# Patient Record
Sex: MALE | Race: WHITE | NOT HISPANIC OR LATINO | Employment: OTHER | URBAN - METROPOLITAN AREA
[De-identification: names, ages, dates, MRNs, and addresses within clinical notes are randomized per-mention and may not be internally consistent; named-entity substitution may affect disease eponyms.]

---

## 2018-04-29 ENCOUNTER — APPOINTMENT (EMERGENCY)
Dept: CT IMAGING | Facility: HOSPITAL | Age: 73
DRG: 293 | End: 2018-04-29
Attending: EMERGENCY MEDICINE
Payer: COMMERCIAL

## 2018-04-29 ENCOUNTER — HOSPITAL ENCOUNTER (INPATIENT)
Facility: HOSPITAL | Age: 73
LOS: 5 days | Discharge: NURSING, REHAB OR LONG-TERM CARE | DRG: 293 | End: 2018-05-04
Attending: EMERGENCY MEDICINE | Admitting: INTERNAL MEDICINE
Payer: COMMERCIAL

## 2018-04-29 ENCOUNTER — APPOINTMENT (EMERGENCY)
Dept: RADIOLOGY | Facility: HOSPITAL | Age: 73
DRG: 293 | End: 2018-04-29
Payer: COMMERCIAL

## 2018-04-29 ENCOUNTER — APPOINTMENT (EMERGENCY)
Dept: CT IMAGING | Facility: HOSPITAL | Age: 73
DRG: 293 | End: 2018-04-29
Payer: COMMERCIAL

## 2018-04-29 DIAGNOSIS — I50.9 CHF (CONGESTIVE HEART FAILURE) (HCC): Primary | ICD-10-CM

## 2018-04-29 DIAGNOSIS — E66.9 OBESE: ICD-10-CM

## 2018-04-29 DIAGNOSIS — I50.33 ACUTE ON CHRONIC DIASTOLIC CONGESTIVE HEART FAILURE (HCC): ICD-10-CM

## 2018-04-29 DIAGNOSIS — E87.6 HYPOKALEMIA: ICD-10-CM

## 2018-04-29 DIAGNOSIS — E83.42 HYPOMAGNESEMIA: ICD-10-CM

## 2018-04-29 DIAGNOSIS — E11.649 TYPE 2 DIABETES MELLITUS WITH HYPOGLYCEMIA WITHOUT COMA, UNSPECIFIED WHETHER LONG TERM INSULIN USE (HCC): ICD-10-CM

## 2018-04-29 LAB
ALBUMIN SERPL BCP-MCNC: 2.7 G/DL (ref 3.5–5)
ALP SERPL-CCNC: 119 U/L (ref 46–116)
ALT SERPL W P-5'-P-CCNC: 8 U/L (ref 12–78)
ANION GAP SERPL CALCULATED.3IONS-SCNC: 6 MMOL/L (ref 4–13)
APTT PPP: 46 SECONDS (ref 23–35)
AST SERPL W P-5'-P-CCNC: 16 U/L (ref 5–45)
ATRIAL RATE: 326 BPM
BASOPHILS # BLD AUTO: 0.01 THOUSANDS/ΜL (ref 0–0.1)
BASOPHILS NFR BLD AUTO: 0 % (ref 0–1)
BILIRUB SERPL-MCNC: 1.2 MG/DL (ref 0.2–1)
BILIRUB UR QL STRIP: NEGATIVE
BUN SERPL-MCNC: 19 MG/DL (ref 5–25)
CALCIUM SERPL-MCNC: 7.7 MG/DL (ref 8.3–10.1)
CHLORIDE SERPL-SCNC: 104 MMOL/L (ref 100–108)
CLARITY UR: CLEAR
CO2 SERPL-SCNC: 36 MMOL/L (ref 21–32)
COLOR UR: YELLOW
CREAT SERPL-MCNC: 1.14 MG/DL (ref 0.6–1.3)
EOSINOPHIL # BLD AUTO: 0.21 THOUSAND/ΜL (ref 0–0.61)
EOSINOPHIL NFR BLD AUTO: 2 % (ref 0–6)
ERYTHROCYTE [DISTWIDTH] IN BLOOD BY AUTOMATED COUNT: 15.2 % (ref 11.6–15.1)
GFR SERPL CREATININE-BSD FRML MDRD: 63 ML/MIN/1.73SQ M
GLUCOSE SERPL-MCNC: 180 MG/DL (ref 65–140)
GLUCOSE SERPL-MCNC: 48 MG/DL (ref 65–140)
GLUCOSE SERPL-MCNC: 52 MG/DL (ref 65–140)
GLUCOSE UR STRIP-MCNC: NEGATIVE MG/DL
HCT VFR BLD AUTO: 33.8 % (ref 36.5–49.3)
HGB BLD-MCNC: 10.1 G/DL (ref 12–17)
HGB UR QL STRIP.AUTO: ABNORMAL
INR PPP: 2.38 (ref 0.86–1.16)
KETONES UR STRIP-MCNC: ABNORMAL MG/DL
LEUKOCYTE ESTERASE UR QL STRIP: NEGATIVE
LYMPHOCYTES # BLD AUTO: 1.05 THOUSANDS/ΜL (ref 0.6–4.47)
LYMPHOCYTES NFR BLD AUTO: 12 % (ref 14–44)
MAGNESIUM SERPL-MCNC: 1.3 MG/DL (ref 1.6–2.6)
MCH RBC QN AUTO: 29.4 PG (ref 26.8–34.3)
MCHC RBC AUTO-ENTMCNC: 29.9 G/DL (ref 31.4–37.4)
MCV RBC AUTO: 99 FL (ref 82–98)
MONOCYTES # BLD AUTO: 0.71 THOUSAND/ΜL (ref 0.17–1.22)
MONOCYTES NFR BLD AUTO: 8 % (ref 4–12)
NEUTROPHILS # BLD AUTO: 6.66 THOUSANDS/ΜL (ref 1.85–7.62)
NEUTS SEG NFR BLD AUTO: 78 % (ref 43–75)
NITRITE UR QL STRIP: NEGATIVE
NT-PROBNP SERPL-MCNC: 4909 PG/ML
PH UR STRIP.AUTO: 7 [PH] (ref 4.5–8)
PLATELET # BLD AUTO: 236 THOUSANDS/UL (ref 149–390)
PMV BLD AUTO: 10.4 FL (ref 8.9–12.7)
POTASSIUM SERPL-SCNC: 2.9 MMOL/L (ref 3.5–5.3)
PROT SERPL-MCNC: 6.5 G/DL (ref 6.4–8.2)
PROT UR STRIP-MCNC: NEGATIVE MG/DL
PROTHROMBIN TIME: 25.9 SECONDS (ref 12.1–14.4)
QRS AXIS: 0 DEGREES
QRSD INTERVAL: 82 MS
QT INTERVAL: 362 MS
QTC INTERVAL: 462 MS
RBC # BLD AUTO: 3.43 MILLION/UL (ref 3.88–5.62)
SODIUM SERPL-SCNC: 146 MMOL/L (ref 136–145)
SP GR UR STRIP.AUTO: 1.01 (ref 1–1.03)
T WAVE AXIS: 58 DEGREES
TROPONIN I SERPL-MCNC: <0.02 NG/ML
TSH SERPL DL<=0.05 MIU/L-ACNC: 3.3 UIU/ML (ref 0.36–3.74)
UROBILINOGEN UR QL STRIP.AUTO: 2 E.U./DL
VENTRICULAR RATE: 98 BPM
WBC # BLD AUTO: 8.64 THOUSAND/UL (ref 4.31–10.16)

## 2018-04-29 PROCEDURE — 70450 CT HEAD/BRAIN W/O DYE: CPT

## 2018-04-29 PROCEDURE — 93005 ELECTROCARDIOGRAM TRACING: CPT

## 2018-04-29 PROCEDURE — 81001 URINALYSIS AUTO W/SCOPE: CPT | Performed by: EMERGENCY MEDICINE

## 2018-04-29 PROCEDURE — 85610 PROTHROMBIN TIME: CPT | Performed by: EMERGENCY MEDICINE

## 2018-04-29 PROCEDURE — 85025 COMPLETE CBC W/AUTO DIFF WBC: CPT | Performed by: EMERGENCY MEDICINE

## 2018-04-29 PROCEDURE — 85730 THROMBOPLASTIN TIME PARTIAL: CPT | Performed by: EMERGENCY MEDICINE

## 2018-04-29 PROCEDURE — 84443 ASSAY THYROID STIM HORMONE: CPT | Performed by: EMERGENCY MEDICINE

## 2018-04-29 PROCEDURE — 73590 X-RAY EXAM OF LOWER LEG: CPT

## 2018-04-29 PROCEDURE — 71045 X-RAY EXAM CHEST 1 VIEW: CPT

## 2018-04-29 PROCEDURE — 83735 ASSAY OF MAGNESIUM: CPT | Performed by: EMERGENCY MEDICINE

## 2018-04-29 PROCEDURE — 96365 THER/PROPH/DIAG IV INF INIT: CPT

## 2018-04-29 PROCEDURE — 96375 TX/PRO/DX INJ NEW DRUG ADDON: CPT

## 2018-04-29 PROCEDURE — 93010 ELECTROCARDIOGRAM REPORT: CPT | Performed by: INTERNAL MEDICINE

## 2018-04-29 PROCEDURE — 83880 ASSAY OF NATRIURETIC PEPTIDE: CPT | Performed by: EMERGENCY MEDICINE

## 2018-04-29 PROCEDURE — 84484 ASSAY OF TROPONIN QUANT: CPT | Performed by: EMERGENCY MEDICINE

## 2018-04-29 PROCEDURE — 82948 REAGENT STRIP/BLOOD GLUCOSE: CPT

## 2018-04-29 PROCEDURE — 36415 COLL VENOUS BLD VENIPUNCTURE: CPT | Performed by: EMERGENCY MEDICINE

## 2018-04-29 PROCEDURE — 80053 COMPREHEN METABOLIC PANEL: CPT | Performed by: EMERGENCY MEDICINE

## 2018-04-29 PROCEDURE — 96374 THER/PROPH/DIAG INJ IV PUSH: CPT

## 2018-04-29 RX ORDER — ATORVASTATIN CALCIUM 40 MG/1
40 TABLET, FILM COATED ORAL DAILY
COMMUNITY

## 2018-04-29 RX ORDER — POTASSIUM CHLORIDE 14.9 MG/ML
20 INJECTION INTRAVENOUS ONCE
Status: COMPLETED | OUTPATIENT
Start: 2018-04-29 | End: 2018-04-30

## 2018-04-29 RX ORDER — FUROSEMIDE 20 MG/1
20 TABLET ORAL DAILY
Status: ON HOLD | COMMUNITY
End: 2018-05-04

## 2018-04-29 RX ORDER — POTASSIUM CHLORIDE 20 MEQ/1
20 TABLET, EXTENDED RELEASE ORAL 2 TIMES DAILY
COMMUNITY

## 2018-04-29 RX ORDER — PANTOPRAZOLE SODIUM 40 MG/1
40 TABLET, DELAYED RELEASE ORAL DAILY
COMMUNITY

## 2018-04-29 RX ORDER — MAGNESIUM SULFATE HEPTAHYDRATE 40 MG/ML
2 INJECTION, SOLUTION INTRAVENOUS ONCE
Status: COMPLETED | OUTPATIENT
Start: 2018-04-29 | End: 2018-04-30

## 2018-04-29 RX ORDER — DEXTROSE MONOHYDRATE 25 G/50ML
50 INJECTION, SOLUTION INTRAVENOUS ONCE
Status: COMPLETED | OUTPATIENT
Start: 2018-04-29 | End: 2018-04-29

## 2018-04-29 RX ORDER — DULOXETIN HYDROCHLORIDE 30 MG/1
20 CAPSULE, DELAYED RELEASE ORAL 2 TIMES DAILY
COMMUNITY

## 2018-04-29 RX ORDER — CALCIUM CARBONATE/VITAMIN D3 500-10/5ML
LIQUID (ML) ORAL DAILY
COMMUNITY

## 2018-04-29 RX ORDER — FUROSEMIDE 10 MG/ML
40 INJECTION INTRAMUSCULAR; INTRAVENOUS ONCE
Status: COMPLETED | OUTPATIENT
Start: 2018-04-29 | End: 2018-04-29

## 2018-04-29 RX ORDER — EZETIMIBE 10 MG/1
10 TABLET ORAL DAILY
COMMUNITY

## 2018-04-29 RX ORDER — ACETAMINOPHEN 325 MG/1
650 TABLET ORAL EVERY 4 HOURS PRN
COMMUNITY

## 2018-04-29 RX ORDER — POTASSIUM CHLORIDE 20 MEQ/1
20 TABLET, EXTENDED RELEASE ORAL ONCE
Status: DISCONTINUED | OUTPATIENT
Start: 2018-04-29 | End: 2018-05-04 | Stop reason: HOSPADM

## 2018-04-29 RX ORDER — BUPROPION HYDROCHLORIDE 150 MG/1
150 TABLET, EXTENDED RELEASE ORAL 2 TIMES DAILY
COMMUNITY

## 2018-04-29 RX ORDER — METOPROLOL TARTRATE 50 MG/1
25 TABLET, FILM COATED ORAL 2 TIMES DAILY
COMMUNITY

## 2018-04-29 RX ORDER — WARFARIN SODIUM 2.5 MG/1
TABLET ORAL DAILY
COMMUNITY

## 2018-04-29 RX ORDER — LEVOTHYROXINE SODIUM 0.05 MG/1
50 TABLET ORAL DAILY
COMMUNITY

## 2018-04-29 RX ADMIN — POTASSIUM CHLORIDE 20 MEQ: 200 INJECTION, SOLUTION INTRAVENOUS at 22:32

## 2018-04-29 RX ADMIN — FUROSEMIDE 40 MG: 10 INJECTION, SOLUTION INTRAVENOUS at 22:44

## 2018-04-29 RX ADMIN — DEXTROSE MONOHYDRATE 50 ML: 25 INJECTION, SOLUTION INTRAVENOUS at 21:30

## 2018-04-30 PROBLEM — J96.01 ACUTE RESPIRATORY FAILURE WITH HYPOXIA (HCC): Status: ACTIVE | Noted: 2018-04-30

## 2018-04-30 PROBLEM — D64.9 ANEMIA: Status: ACTIVE | Noted: 2018-04-30

## 2018-04-30 PROBLEM — E87.6 HYPOKALEMIA: Status: ACTIVE | Noted: 2018-04-30

## 2018-04-30 PROBLEM — E83.42 HYPOMAGNESEMIA: Status: ACTIVE | Noted: 2018-04-30

## 2018-04-30 LAB
ANION GAP SERPL CALCULATED.3IONS-SCNC: 5 MMOL/L (ref 4–13)
ANION GAP SERPL CALCULATED.3IONS-SCNC: 7 MMOL/L (ref 4–13)
BACTERIA UR QL AUTO: ABNORMAL /HPF
BUN SERPL-MCNC: 16 MG/DL (ref 5–25)
BUN SERPL-MCNC: 18 MG/DL (ref 5–25)
CALCIUM SERPL-MCNC: 7.5 MG/DL (ref 8.3–10.1)
CALCIUM SERPL-MCNC: 8.1 MG/DL (ref 8.3–10.1)
CHLORIDE SERPL-SCNC: 104 MMOL/L (ref 100–108)
CHLORIDE SERPL-SCNC: 105 MMOL/L (ref 100–108)
CHOLEST SERPL-MCNC: 61 MG/DL (ref 50–200)
CO2 SERPL-SCNC: 34 MMOL/L (ref 21–32)
CO2 SERPL-SCNC: 35 MMOL/L (ref 21–32)
CREAT SERPL-MCNC: 1.02 MG/DL (ref 0.6–1.3)
CREAT SERPL-MCNC: 1.04 MG/DL (ref 0.6–1.3)
ERYTHROCYTE [DISTWIDTH] IN BLOOD BY AUTOMATED COUNT: 15.2 % (ref 11.6–15.1)
FERRITIN SERPL-MCNC: 53 NG/ML (ref 8–388)
GFR SERPL CREATININE-BSD FRML MDRD: 70 ML/MIN/1.73SQ M
GFR SERPL CREATININE-BSD FRML MDRD: 72 ML/MIN/1.73SQ M
GLUCOSE SERPL-MCNC: 121 MG/DL (ref 65–140)
GLUCOSE SERPL-MCNC: 74 MG/DL (ref 65–140)
GLUCOSE SERPL-MCNC: 76 MG/DL (ref 65–140)
GLUCOSE SERPL-MCNC: 79 MG/DL (ref 65–140)
GLUCOSE SERPL-MCNC: 88 MG/DL (ref 65–140)
GLUCOSE SERPL-MCNC: 88 MG/DL (ref 65–140)
GLUCOSE SERPL-MCNC: 89 MG/DL (ref 65–140)
HCT VFR BLD AUTO: 32.1 % (ref 36.5–49.3)
HDLC SERPL-MCNC: 33 MG/DL (ref 40–60)
HGB BLD-MCNC: 9.5 G/DL (ref 12–17)
INR PPP: 2.44 (ref 0.86–1.16)
IRON SATN MFR SERPL: 19 %
IRON SERPL-MCNC: 66 UG/DL (ref 65–175)
LDLC SERPL CALC-MCNC: 20 MG/DL (ref 0–100)
MAGNESIUM SERPL-MCNC: 1.6 MG/DL (ref 1.6–2.6)
MCH RBC QN AUTO: 29.1 PG (ref 26.8–34.3)
MCHC RBC AUTO-ENTMCNC: 29.6 G/DL (ref 31.4–37.4)
MCV RBC AUTO: 98 FL (ref 82–98)
NON-SQ EPI CELLS URNS QL MICRO: ABNORMAL /HPF
PLATELET # BLD AUTO: 222 THOUSANDS/UL (ref 149–390)
PMV BLD AUTO: 10.5 FL (ref 8.9–12.7)
POTASSIUM SERPL-SCNC: 2.7 MMOL/L (ref 3.5–5.3)
POTASSIUM SERPL-SCNC: 3.4 MMOL/L (ref 3.5–5.3)
PROTHROMBIN TIME: 26.4 SECONDS (ref 12.1–14.4)
RBC # BLD AUTO: 3.27 MILLION/UL (ref 3.88–5.62)
RBC #/AREA URNS AUTO: ABNORMAL /HPF
SODIUM SERPL-SCNC: 145 MMOL/L (ref 136–145)
SODIUM SERPL-SCNC: 145 MMOL/L (ref 136–145)
TIBC SERPL-MCNC: 344 UG/DL (ref 250–450)
TRIGL SERPL-MCNC: 39 MG/DL
TROPONIN I SERPL-MCNC: <0.02 NG/ML
TROPONIN I SERPL-MCNC: <0.02 NG/ML
WBC # BLD AUTO: 7.62 THOUSAND/UL (ref 4.31–10.16)
WBC #/AREA URNS AUTO: ABNORMAL /HPF

## 2018-04-30 PROCEDURE — 99233 SBSQ HOSP IP/OBS HIGH 50: CPT | Performed by: HOSPITALIST

## 2018-04-30 PROCEDURE — 80048 BASIC METABOLIC PNL TOTAL CA: CPT | Performed by: HOSPITALIST

## 2018-04-30 PROCEDURE — 82948 REAGENT STRIP/BLOOD GLUCOSE: CPT

## 2018-04-30 PROCEDURE — 94640 AIRWAY INHALATION TREATMENT: CPT

## 2018-04-30 PROCEDURE — 99285 EMERGENCY DEPT VISIT HI MDM: CPT

## 2018-04-30 PROCEDURE — 82728 ASSAY OF FERRITIN: CPT | Performed by: NURSE PRACTITIONER

## 2018-04-30 PROCEDURE — 83735 ASSAY OF MAGNESIUM: CPT | Performed by: NURSE PRACTITIONER

## 2018-04-30 PROCEDURE — 83540 ASSAY OF IRON: CPT | Performed by: NURSE PRACTITIONER

## 2018-04-30 PROCEDURE — 85610 PROTHROMBIN TIME: CPT | Performed by: NURSE PRACTITIONER

## 2018-04-30 PROCEDURE — 87147 CULTURE TYPE IMMUNOLOGIC: CPT | Performed by: HOSPITALIST

## 2018-04-30 PROCEDURE — 85027 COMPLETE CBC AUTOMATED: CPT | Performed by: NURSE PRACTITIONER

## 2018-04-30 PROCEDURE — 87081 CULTURE SCREEN ONLY: CPT | Performed by: HOSPITALIST

## 2018-04-30 PROCEDURE — 83550 IRON BINDING TEST: CPT | Performed by: NURSE PRACTITIONER

## 2018-04-30 PROCEDURE — 84484 ASSAY OF TROPONIN QUANT: CPT | Performed by: NURSE PRACTITIONER

## 2018-04-30 PROCEDURE — 80048 BASIC METABOLIC PNL TOTAL CA: CPT | Performed by: NURSE PRACTITIONER

## 2018-04-30 PROCEDURE — 80061 LIPID PANEL: CPT | Performed by: NURSE PRACTITIONER

## 2018-04-30 PROCEDURE — 94760 N-INVAS EAR/PLS OXIMETRY 1: CPT

## 2018-04-30 RX ORDER — WARFARIN SODIUM 2.5 MG/1
2.5 TABLET ORAL DAILY
Status: DISCONTINUED | OUTPATIENT
Start: 2018-04-30 | End: 2018-05-04 | Stop reason: HOSPADM

## 2018-04-30 RX ORDER — PANTOPRAZOLE SODIUM 40 MG/1
40 TABLET, DELAYED RELEASE ORAL
Status: DISCONTINUED | OUTPATIENT
Start: 2018-04-30 | End: 2018-05-04 | Stop reason: HOSPADM

## 2018-04-30 RX ORDER — ATORVASTATIN CALCIUM 40 MG/1
40 TABLET, FILM COATED ORAL
Status: DISCONTINUED | OUTPATIENT
Start: 2018-04-30 | End: 2018-05-04 | Stop reason: HOSPADM

## 2018-04-30 RX ORDER — POTASSIUM CHLORIDE 20 MEQ/1
40 TABLET, EXTENDED RELEASE ORAL ONCE
Status: COMPLETED | OUTPATIENT
Start: 2018-04-30 | End: 2018-04-30

## 2018-04-30 RX ORDER — ACETAMINOPHEN 325 MG/1
650 TABLET ORAL EVERY 4 HOURS PRN
Status: DISCONTINUED | OUTPATIENT
Start: 2018-04-30 | End: 2018-05-04 | Stop reason: HOSPADM

## 2018-04-30 RX ORDER — BUPROPION HYDROCHLORIDE 150 MG/1
150 TABLET, EXTENDED RELEASE ORAL 2 TIMES DAILY
Status: DISCONTINUED | OUTPATIENT
Start: 2018-04-30 | End: 2018-05-04 | Stop reason: HOSPADM

## 2018-04-30 RX ORDER — POTASSIUM CHLORIDE 14.9 MG/ML
20 INJECTION INTRAVENOUS
Status: DISPENSED | OUTPATIENT
Start: 2018-04-30 | End: 2018-04-30

## 2018-04-30 RX ORDER — DULOXETIN HYDROCHLORIDE 20 MG/1
20 CAPSULE, DELAYED RELEASE ORAL 2 TIMES DAILY
Status: DISCONTINUED | OUTPATIENT
Start: 2018-04-30 | End: 2018-05-04 | Stop reason: HOSPADM

## 2018-04-30 RX ORDER — FUROSEMIDE 10 MG/ML
40 INJECTION INTRAMUSCULAR; INTRAVENOUS
Status: DISCONTINUED | OUTPATIENT
Start: 2018-04-30 | End: 2018-05-02

## 2018-04-30 RX ORDER — MELATONIN
2000 DAILY
Status: DISCONTINUED | OUTPATIENT
Start: 2018-04-30 | End: 2018-05-04 | Stop reason: HOSPADM

## 2018-04-30 RX ORDER — POTASSIUM CHLORIDE 20 MEQ/1
20 TABLET, EXTENDED RELEASE ORAL 2 TIMES DAILY
Status: DISCONTINUED | OUTPATIENT
Start: 2018-04-30 | End: 2018-05-04 | Stop reason: HOSPADM

## 2018-04-30 RX ORDER — EZETIMIBE 10 MG/1
10 TABLET ORAL DAILY
Status: DISCONTINUED | OUTPATIENT
Start: 2018-04-30 | End: 2018-05-04 | Stop reason: HOSPADM

## 2018-04-30 RX ORDER — LEVOTHYROXINE SODIUM 0.03 MG/1
50 TABLET ORAL
Status: DISCONTINUED | OUTPATIENT
Start: 2018-04-30 | End: 2018-05-04 | Stop reason: HOSPADM

## 2018-04-30 RX ADMIN — METOPROLOL TARTRATE 25 MG: 25 TABLET ORAL at 17:01

## 2018-04-30 RX ADMIN — DULOXETINE HYDROCHLORIDE 20 MG: 20 CAPSULE, DELAYED RELEASE ORAL at 08:57

## 2018-04-30 RX ADMIN — POTASSIUM CHLORIDE 20 MEQ: 200 INJECTION, SOLUTION INTRAVENOUS at 06:29

## 2018-04-30 RX ADMIN — METOPROLOL TARTRATE 25 MG: 25 TABLET ORAL at 08:56

## 2018-04-30 RX ADMIN — WARFARIN SODIUM 2.5 MG: 2.5 TABLET ORAL at 17:01

## 2018-04-30 RX ADMIN — POTASSIUM CHLORIDE 20 MEQ: 1500 TABLET, EXTENDED RELEASE ORAL at 08:56

## 2018-04-30 RX ADMIN — FUROSEMIDE 40 MG: 10 INJECTION, SOLUTION INTRAVENOUS at 08:56

## 2018-04-30 RX ADMIN — MAGNESIUM SULFATE HEPTAHYDRATE 2 G: 40 INJECTION, SOLUTION INTRAVENOUS at 02:02

## 2018-04-30 RX ADMIN — PANTOPRAZOLE SODIUM 40 MG: 40 TABLET, DELAYED RELEASE ORAL at 06:28

## 2018-04-30 RX ADMIN — VITAMIN D, TAB 1000IU (100/BT) 2000 UNITS: 25 TAB at 08:56

## 2018-04-30 RX ADMIN — LEVOTHYROXINE SODIUM 50 MCG: 25 TABLET ORAL at 06:28

## 2018-04-30 RX ADMIN — DULOXETINE HYDROCHLORIDE 20 MG: 20 CAPSULE, DELAYED RELEASE ORAL at 17:02

## 2018-04-30 RX ADMIN — EZETIMIBE 10 MG: 10 TABLET ORAL at 08:56

## 2018-04-30 RX ADMIN — CARBIDOPA AND LEVODOPA 1 TABLET: 25; 100 TABLET ORAL at 06:28

## 2018-04-30 RX ADMIN — POTASSIUM CHLORIDE 20 MEQ: 1500 TABLET, EXTENDED RELEASE ORAL at 17:01

## 2018-04-30 RX ADMIN — FUROSEMIDE 40 MG: 10 INJECTION, SOLUTION INTRAVENOUS at 17:01

## 2018-04-30 RX ADMIN — CARBIDOPA AND LEVODOPA 1 TABLET: 25; 100 TABLET ORAL at 12:31

## 2018-04-30 RX ADMIN — CARBIDOPA AND LEVODOPA 1 TABLET: 25; 100 TABLET ORAL at 20:29

## 2018-04-30 RX ADMIN — FLUTICASONE PROPIONATE AND SALMETEROL 1 PUFF: 50; 250 POWDER RESPIRATORY (INHALATION) at 20:35

## 2018-04-30 RX ADMIN — FLUTICASONE PROPIONATE AND SALMETEROL 1 PUFF: 50; 250 POWDER RESPIRATORY (INHALATION) at 08:18

## 2018-04-30 RX ADMIN — POTASSIUM CHLORIDE 40 MEQ: 1500 TABLET, EXTENDED RELEASE ORAL at 08:57

## 2018-04-30 RX ADMIN — CARBIDOPA AND LEVODOPA 1 TABLET: 25; 100 TABLET ORAL at 02:01

## 2018-04-30 RX ADMIN — Medication 400 MG: at 08:56

## 2018-04-30 RX ADMIN — BUPROPION HYDROCHLORIDE 150 MG: 150 TABLET, EXTENDED RELEASE ORAL at 17:02

## 2018-04-30 RX ADMIN — POTASSIUM CHLORIDE 40 MEQ: 1500 TABLET, EXTENDED RELEASE ORAL at 06:28

## 2018-04-30 RX ADMIN — TIOTROPIUM BROMIDE 18 MCG: 18 CAPSULE ORAL; RESPIRATORY (INHALATION) at 08:18

## 2018-04-30 RX ADMIN — ATORVASTATIN CALCIUM 40 MG: 40 TABLET, FILM COATED ORAL at 17:01

## 2018-04-30 NOTE — ASSESSMENT & PLAN NOTE
Potassium 2 9 in ED  Pt given 40meq of IV potassium  Continue 20meq KCL BID   Will recheck BMP in am

## 2018-04-30 NOTE — CASE MANAGEMENT
Initial Clinical Review    Admission: Date/Time/Statement: 4/29/18 @ 2357     Orders Placed This Encounter   Procedures    Inpatient Admission (expected length of stay for this patient is greater than two midnights)     Standing Status:   Standing     Number of Occurrences:   1     Order Specific Question:   Admitting Physician     Answer:   Sarah Escobar [[id number]]     Order Specific Question:   Level of Care     Answer:   Med Surg [16]     Order Specific Question:   Estimated length of stay     Answer:   More than 2 Midnights     Order Specific Question:   Certification     Answer:   I certify that inpatient services are medically necessary for this patient for a duration of greater than two midnights  See H&P and MD Progress Notes for additional information about the patient's course of treatment  ED: Date/Time/Mode of Arrival:   ED Arrival Information     Expected Arrival Acuity Means of Arrival Escorted By Service Admission Type    - 4/29/2018 20:33 Urgent Stretcher [facility] General Medicine Urgent    Arrival Complaint    RESPIRATORY DISTRESS      Chief Complaint:   Chief Complaint   Patient presents with    Shortness of Breath     pt presents to ER from [facility] with shortness of breath increased throughout the day, pts BS was in the 50's pt was given oral glucose and OJ per EMS  History of Illness:   Patient brought in from [facility] by ambulance for shortness of breath several hours  Ambulance crew noted BS 51 and gave glucose oral, OJ, and no improvement of BS  Patient was mid [de-identified] on room air but came up to 90s on oxygen  Patient denies SOB    He is oriented to date  ED Vital Signs:   ED Triage Vitals   Temperature Pulse Respirations Blood Pressure SpO2   04/29/18 2037 04/29/18 2037 04/29/18 2037 04/29/18 2045 04/29/18 2037   (!) 96 9 °F (36 1 °C) 98 20 135/79 (!) 86 %      Temp Source Heart Rate Source Patient Position - Orthostatic VS BP Location FiO2 (%)   04/29/18 2037 04/29/18 2037 04/30/18 0121 -- --   Temporal Monitor Lying        Pain Score       04/29/18 2037       No Pain        Wt Readings from Last 1 Encounters:   04/30/18 129 kg (284 lb 6 3 oz)   Vital Signs (abnormal): Abnormal Labs/Diagnostic Test Results:   HGB 10 1 PT 25 9 INR 2 38 PTT 46  K 2 9 CO2 36 GLUC 52 CA 7 7 ALT 8 ALK PHOS 119 ALB 2 7 TBILI 1 20 MG 1 3 BNP 4909  U/A+KETONES, BLOOD   CT HEAD= No CT evidence of acute intracranial process  Chronic microangiopathy  EKG=  Ventricular Rate BPM 98    Atrial Rate     NJ Interval ms    QRSD Interval ms 82    QT Interval ms 362    QTC Interval ms 462    P Axis degrees    QRS Axis degrees 0    T Wave Axis degrees 58      Atrial fibrillation with premature ventricular or aberrantly conducted complexes  Nonspecific ST and T wave abnormality  Abnormal ECG      ED Treatment:   Medication Administration from 04/29/2018 2033 to 04/30/2018 0105       Date/Time Order Dose Route Action Action by Comments     04/29/2018 2130 dextrose 50 % IV solution 50 mL 50 mL Intravenous Given Loreto Gonzáles RN      04/29/2018 2244 furosemide (LASIX) injection 40 mg 40 mg Intravenous Given Jose Cruz Line, RN      04/29/2018 2232 potassium chloride 20 mEq IVPB (premix) 20 mEq Intravenous [address] Jose Cruz Line, RN      04/29/2018 2228 potassium chloride (K-DUR,KLOR-CON) CR tablet 20 mEq 20 mEq Oral Not Given Jose Cruz Line, RN pt failed dysphagia     04/29/2018 2228 magnesium oxide (MAG-OX) tablet 400 mg 400 mg Oral Not Given Jose Cruz Line, RN pt failed dysphagia      Past Medical/Surgical History:    Active Ambulatory Problems     Diagnosis Date Noted    No Active Ambulatory Problems     Resolved Ambulatory Problems     Diagnosis Date Noted    No Resolved Ambulatory Problems     Past Medical History:   Diagnosis Date    Atrial fibrillation (HCC)     BPH (benign prostatic hyperplasia)     CAD (coronary artery disease)     CHF (congestive heart failure) (HCC)     COPD (chronic obstructive pulmonary disease) ([place] [address] )     Diabetes mellitus ([place] [address] )     Disease of thyroid gland     Hyperlipidemia     Hypertension     Metabolic encephalopathy     Obese     Parkinson disease ([place] [address] )    Admitting Diagnosis: Shortness of breath [R06 02]  Hypokalemia [E87 6]  Hypomagnesemia [E83 42]  CHF (congestive heart failure) ([place] [address] ) [I50 9]  Age/Sex: 74 y o  male  Assessment/Plan:   Acute respiratory failure with hypoxia ([facility])   Assessment & Plan     Administer O2 and titrate to keep SPO2>92%       CHF (congestive heart failure) ([facility])   Assessment & Plan     BNP 4909 in ED  CXR shows CHF  Will change Lasix 20mg PO daily to 40mg IV BID  Monitor I/O and daily weights  Will order ECHO        Hypomagnesemia   Assessment & Plan     Mag 1 3 in ED  Repleted with 2gm Mag Sulfate  Continue mag ox 400mg daily        Hypokalemia   Assessment & Plan     Potassium 2 9 in ED  Pt given 40meq of IV potassium  Continue 20meq KCL BID         Diabetes mellitus with hypoglycemia ([facility])   Assessment & Plan     Pt had glucose of 52 in ED  Given 1 amp of D50  Will hold Lantus and Victoza for now  Monitor blood glucose QAC and HS and initiate SSI        Anemia   Assessment & Plan     Hgb 10 1 in ED  Will check stool for occult blood  Will check iron panel  Monitor CBC and transfuse as needed         Parkinson disease ([place] [address] )   Assessment & Plan     Continue Sinemet         Atrial fibrillation ([facility])   Assessment & Plan     Currently afib ranging from 80-90s  Continue metoprolol  Continue coumadin for now  Monitor daily INR  Pt is scheduled for BEA on 5/2  For possible ablation vs pacemaker         Hypertension   Assessment & Plan     BP controlled  Continue metoprolol 25mg BID  Monitor BP per nursing unit         COPD (chronic obstructive pulmonary disease) ([facility])   Assessment & Plan     Pt had some wheezing noted on auscultation  Continue Advair and spiriva         Obese   Assessment & Plan     Inpatient consult to nutrition  Admission Orders:  TELEMETRY  ACCUCHECKS WITH COVERAGE SCALE  O2 TO KEEP SATS>92%  VENODYNES  Scheduled Meds:   Current Facility-Administered Medications:  acetaminophen 650 mg Oral Q4H PRN ANTON Arango    atorvastatin 40 mg Oral Daily With [employer], ANTON    buPROPion 150 mg Oral BID ANTON Arango    carbidopa-levodopa 1 tablet Oral Q6H ANTON Arango    cholecalciferol 2,000 Units Oral Daily ANTON Hodges    DULoxetine 20 mg Oral BID ANTON Arango    ezetimibe 10 mg Oral Daily ANTON Arango    fluticasone-salmeterol 1 puff Inhalation Q12H [address] ANTON Hodges    furosemide 40 mg Intravenous BID (diuretic) ANTON Arango    insulin lispro 1-5 Units Subcutaneous TID AC ANTON Hodges    insulin lispro 1-5 Units Subcutaneous HS ANTON Hodges    levothyroxine 50 mcg Oral Early Morning ANTON Hodges    magnesium oxide 400 mg Oral Daily ANTON Hodges    metoprolol tartrate 25 mg Oral BID ANTON oHdges    pantoprazole 40 mg Oral Daily Before Breakfast ANTON Hodges    potassium chloride 20 mEq Oral Once Barbara Shan, DO    potassium chloride 20 mEq Oral BID [place] ANTON Neville    potassium chloride 20 mEq Intravenous Q2H ANTON Hodges Last Rate: 20 mEq (04/30/18 0629)   tiotropium 18 mcg Inhalation Daily ANTON Hodges    warfarin 2 5 mg Oral Daily ANTON Hodges      Continuous Infusions:    PRN Meds:   acetaminophen  Thank you,  [facility] in the [facility] by Aashish Brar for 2017  Network Utilization Review Department  Phone: [phone]; Fax [phone]  ATTENTION: The Network Utilization Review Department is now centralized for our 7 Facilities  Make a note that we have a new phone and fax numbers for our Department   Please call with any questions or concerns to [phone] and carefully follow the prompts so that you are directed to the right person  All voicemails are confidential  Fax any determinations, approvals, denials, and requests for initial or continue stay review clinical to [phone]  Due to HIGH CALL volume, it would be easier if you could please send faxed requests to expedite your requests and in part, help us provide discharge notifications faster

## 2018-04-30 NOTE — ASSESSMENT & PLAN NOTE
Pt had glucose of 52 in ED  Given 1 amp of D50  Will hold Lantus and Victoza for now  Monitor blood glucose QAC and HS and initiate SSI

## 2018-04-30 NOTE — ASSESSMENT & PLAN NOTE
Hgb 10 1 in ED  No obvious bleeding noted  Will check stool for occult blood  Will check iron panel  Monitor CBC and transfuse as needed

## 2018-04-30 NOTE — ASSESSMENT & PLAN NOTE
Currently afib ranging from 80-90s  Continue metoprolol  Continue coumadin for now  Monitor daily INR  Pt is scheduled for BEA on 5/2  For possible ablation vs pacemaker

## 2018-04-30 NOTE — PROGRESS NOTES
Progress Note Kenroy Arreaga 1945, 74 y o  male MRN: [id number]    Unit/Bed#: [address] [place] [address] Encounter: [id number]    Primary Care Provider: Sade Da Silva MD   Date and time admitted to hospital: 4/29/2018  8:34 PM        Anemia   Assessment & Plan    -iron panel pending  -suspect anemia of chronic disease        Hypokalemia   Assessment & Plan    -early this morning 20 mEq IV potassium was ordered and will order another 40 mEq of oral potassium now  -repeat BMP at 1600        Hypomagnesemia   Assessment & Plan    -continue oral magnesium        Permanent atrial fibrillation (HCC)   Assessment & Plan    -continue Coumadin, INR therapeutic  -continue beta-blocker        CHF (congestive heart failure) ([facility])   Assessment & Plan    -see above  -echo pending        COPD (chronic obstructive pulmonary disease) ([facility])   Assessment & Plan    -continue Spiriva and Advair        Diabetes mellitus with hypoglycemia ([facility])   Assessment & Plan    -continue sliding scale insulin if needed        Hypertension   Assessment & Plan    -continue beta-blocker        Obesity due to excess calories   Assessment & Plan    -encourage weight loss        Parkinson disease ([facility] )   Assessment & Plan    -continue Sinemet        * Acute respiratory failure with hypoxia ([facility])   Assessment & Plan    -due to acute congestive heart failure exacerbation  -check echocardiogram today  -continue IV Lasix  -consult Cardiology          Situation remains one of high risk  VTE Pharmacologic Prophylaxis:   Pharmacologic: Warfarin (Coumadin)  Mechanical VTE Prophylaxis in Place: Yes    Patient Centered Rounds: I have performed bedside rounds with nursing staff today  Education and Discussions with Family / Patient:  Patient    Time Spent for Care: 20 minutes  More than 50% of total time spent on counseling and coordination of care as described above      Current Length of Stay: 1 day(s)    Current Patient Status: Inpatient   Certification Statement: The patient will continue to require additional inpatient hospital stay due to CHF exacerbation    Discharge Plan:  2-3 days    Code Status: Level 1 - Full Code      Subjective:   Patient without new complaints this morning  Objective:     Vitals:   Temp (24hrs), Av 8 °F (36 6 °C), Min:96 9 °F (36 1 °C), Max:98 4 °F (36 9 °C)    HR:  [74-99] 74  Resp:  [16-21] 20  BP: (126-151)/() 130/50  SpO2:  [86 %-100 %] 94 %  There is no height or weight on file to calculate BMI  Input and Output Summary (last 24 hours):        Intake/Output Summary (Last 24 hours) at 18 0833  Last data filed at 18 0301   Gross per 24 hour   Intake                0 ml   Output             1450 ml   Net            -1450 ml       Physical Exam:     Physical Exam  Gen: NAD, awake and alert, well developed, well nourished  Eyes: EOMI, PERRLA, no scleral icterus  ENMT:  Oropharynx clear of erythema or exudates, no nasal discharge, no otic discharge, moist mucous membranes  Neck:  Supple  Lymph:  No anterior or posterior cervical or supraclavicular lymphadenopathy  Cardiovascular:  Irregularly irregular rhythm, normal rate, normal S1-S2, no murmurs, rubs, or gallops  Lungs:  Decreased breath sounds in the bases, no wheezes, or rales, or rhonchi  Abdomen:  Positive bowel sounds, soft, nontender, nondistended, no palpable organomegaly   Skin:  Intact, no obvious lesions or rashes, trace lower extremity edema  Neuro: Cranial nerves 2-12 are intact, non-focal      Additional Data:     Labs:      Results from last 7 days  Lab Units 18  0504 18  2129   WBC Thousand/uL 7 62 8 64   HEMOGLOBIN g/dL 9 5* 10 1*   HEMATOCRIT % 32 1* 33 8*   PLATELETS Thousands/uL 222 236   NEUTROS PCT %  --  78*   LYMPHS PCT %  --  12*   MONOS PCT %  --  8   EOS PCT %  --  2       Results from last 7 days  Lab Units 18  0503 18  2129   SODIUM mmol/L 145 146*   POTASSIUM mmol/L 2 7* 2 9*   CHLORIDE mmol/L 104 104 CO2 mmol/L 34* 36*   BUN mg/dL 16 19   CREATININE mg/dL 1 02 1 14   CALCIUM mg/dL 7 5* 7 7*   TOTAL PROTEIN g/dL  --  6 5   BILIRUBIN TOTAL mg/dL  --  1 20*   ALK PHOS U/L  --  119*   ALT U/L  --  8*   AST U/L  --  16   GLUCOSE RANDOM mg/dL 79 52*       Results from last 7 days  Lab Units 04/30/18  0503   INR  2 44*       Results from last 7 days  Lab Units 04/30/18  0637 04/30/18  0148 04/29/18  2147 04/29/18  2045   POC GLUCOSE mg/dl 76 74 180* 48*             * I Have Reviewed All Lab Data Listed Above  * Additional Pertinent Lab Tests Reviewed:  [facility]    Recent Cultures (last 7 days):           Last 24 Hours Medication List:     Current Facility-Administered Medications:  acetaminophen 650 mg Oral Q4H PRN Jolaine Felipe, ANTON    atorvastatin 40 mg Oral Daily With [employer], CRNP    buPROPion 150 mg Oral BID ANTON Carias    carbidopa-levodopa 1 tablet Oral Q6H Jada Wang, ANTON    cholecalciferol 2,000 Units Oral Daily ANTON Hodges    DULoxetine 20 mg Oral BID ANTON Carias    ezetimibe 10 mg Oral Daily ANTON Carias    fluticasone-salmeterol 1 puff Inhalation Q12H [facility] ANTON Hodges    furosemide 40 mg Intravenous BID (diuretic) ANTON Carias    insulin lispro 1-5 Units Subcutaneous TID AC ANTON Hodges    insulin lispro 1-5 Units Subcutaneous HS ANTON Hodgse    levothyroxine 50 mcg Oral Early Morning ANTON Hodges    magnesium oxide 400 mg Oral Daily ANTON Hodges    metoprolol tartrate 25 mg Oral BID ANTON Hodges    pantoprazole 40 mg Oral Daily Before Breakfast ANTON Hodges    potassium chloride 20 mEq Oral Once Lucía Slaughter DO    potassium chloride 20 mEq Oral BID ANTON Alfaro    potassium chloride 40 mEq Oral Once Shaq Mccrary MD    potassium chloride 20 mEq Intravenous Q2H ANTON Hodges Last Rate: 20 mEq (04/30/18 1724)   tiotropium 18 mcg Inhalation Daily ANTON Pederson    warfarin 2 5 mg Oral Daily ANTON Rasheed         Today, Patient Was Seen By: Nita Hare MD    ** Please Note: Dictation voice to text software may have been used in the creation of this document   **

## 2018-04-30 NOTE — ASSESSMENT & PLAN NOTE
BNP 4909 in ED  CXR shows CHF  Will change Lasix 20mg PO daily to 40mg IV BID  Monitor I/O and daily weights  Will order ECHO for tomorrow

## 2018-04-30 NOTE — SOCIAL WORK
Met with Pt  Pt presents AA&Ox3  Discussed role of   Pt confirmed that he has been at [facility] for SNF  Pt requested  to call his dtr or wife  Call made to Pt's wife(Sunni: [phone]), left message, anticipate return call  Call made to Pt's dtr(Kayy: [phone]), confirmed Pt has been at [facility] for SNF  Pt's dtr requesting referral to [facility] as first choice due to location and is agreeable for return to [facility] if no beds available at [facility]  Referrals sent to [facility] and [facility] via [place]  Pt has been at [facility] since 3/23/18  Per PT at [facility], Pt has been assist x1 for adls and assist x2 & lift with transfers recently  Pt was ambulating 20 feet with rolling walker and contact guard but has had recent decline  Await PT/OT recommendations  Will need to obtain insurance auth for SNF  Will follow

## 2018-04-30 NOTE — ASSESSMENT & PLAN NOTE
-early this morning 20 mEq IV potassium was ordered and will order another 40 mEq of oral potassium now  -repeat BMP at 1600

## 2018-04-30 NOTE — ASSESSMENT & PLAN NOTE
Mag 1 3 in ED  Repleted with 2gm Mag Sulfate  Continue mag ox 400mg daily   Will recheck Mag level in am

## 2018-04-30 NOTE — ASSESSMENT & PLAN NOTE
-due to acute congestive heart failure exacerbation  -check echocardiogram today  -continue IV Lasix  -consult Cardiology

## 2018-04-30 NOTE — ED PROVIDER NOTES
History  Chief Complaint   Patient presents with    Shortness of Breath     pt presents to ER from [facility] with shortness of breath increased throughout the day, pts BS was in the 50's pt was given oral glucose and OJ per EMS  [place] [facility] [place] for shortness of breath several hours  Ambulance crew noted BS 51 and gave glucose oral, OJ, and no improvement of BS  Patient was mid [de-identified] on room air but came up to 90s on oxygen  Patient denies SOB  He is oriented to date  Prior to Admission Medications   Prescriptions Last Dose Informant Patient Reported? Taking?    Cholecalciferol (VITAMIN D3 PO)   Yes Yes   Sig: Take 2,000 Units by mouth   DULoxetine (CYMBALTA) 30 mg delayed release capsule   Yes Yes   Sig: Take 20 mg by mouth 2 (two) times a day   Insulin Glargine (BASAGLAR KWIKPEN SC)   Yes Yes   Sig: Inject 20 Units under the skin   Liraglutide (VICTOZA) 18 MG/3ML SOPN   Yes Yes   Sig: Inject 1 8 mg under the skin   Magnesium Oxide 400 MG CAPS   Yes Yes   Sig: Take by mouth daily   Tiotropium Bromide Monohydrate (SPIRIVA HANDIHALER IN)   Yes Yes   Sig: Inhale 18 mcg   acetaminophen (TYLENOL) 325 mg tablet   Yes Yes   Sig: Take 650 mg by mouth every 4 (four) hours as needed for mild pain   atorvastatin (LIPITOR) 40 mg tablet   Yes Yes   Sig: Take 40 mg by mouth daily   bisacodyl (DULCOLAX) 5 mg EC tablet   Yes Yes   Sig: Take 10 mg by mouth daily as needed for constipation   buPROPion (ZYBAN) 150 MG 12 hr tablet   Yes Yes   Sig: Take 150 mg by mouth 2 (two) times a day   carbidopa-levodopa (SINEMET)  mg per tablet   Yes Yes   Sig: Take 1 tablet by mouth every 6 (six) hours   ezetimibe (ZETIA) 10 mg tablet   Yes Yes   Sig: Take 10 mg by mouth daily   fluticasone-salmeterol (ADVAIR) 250-50 mcg/dose inhaler   Yes Yes   Sig: Inhale 1 puff every 12 (twelve) hours   furosemide (LASIX) 20 mg tablet   Yes Yes   Sig: Take 20 mg by mouth daily levothyroxine 50 mcg tablet   Yes Yes   Sig: Take 50 mcg by mouth daily   magnesium hydroxide (MILK OF MAGNESIA) 400 mg/5 mL oral suspension   Yes Yes   Sig: Take by mouth daily as needed for constipation   metoprolol tartrate (LOPRESSOR) 50 mg tablet   Yes Yes   Sig: Take 25 mg by mouth 2 (two) times a day   pantoprazole (PROTONIX) 40 mg tablet   Yes Yes   Sig: Take 40 mg by mouth daily   potassium chloride (K-DUR,KLOR-CON) 20 mEq tablet   Yes Yes   Sig: Take 20 mEq by mouth 2 (two) times a day   warfarin (COUMADIN) 2 5 mg tablet   Yes Yes   Sig: Take by mouth daily      Facility-Administered Medications: None       Past Medical History:   Diagnosis Date    Atrial fibrillation (HCC)     BPH (benign prostatic hyperplasia)     CAD (coronary artery disease)     CHF (congestive heart failure) (HCC)     COPD (chronic obstructive pulmonary disease) (HCC)     Diabetes mellitus ([facility] )     Disease of thyroid gland     HYPO    Hyperlipidemia     Hypertension     Metabolic encephalopathy     Obese     Parkinson disease ([facility] )        No past surgical history on file  No family history on file  I have reviewed and agree with the history as documented      Social History   Substance Use Topics    Smoking status: Not on file    Smokeless tobacco: Not on file    Alcohol use Not on file        Review of Systems   Unable to perform ROS: Acuity of condition       Physical Exam  ED Triage Vitals   Temperature Pulse Respirations Blood Pressure SpO2   04/29/18 2037 04/29/18 2037 04/29/18 2037 04/29/18 2045 04/29/18 2037   (!) 96 9 °F (36 1 °C) 98 20 135/79 (!) 86 %      Temp Source Heart Rate Source Patient Position - Orthostatic VS BP Location FiO2 (%)   04/29/18 2037 04/29/18 2037 04/30/18 0121 -- --   Temporal Monitor Lying        Pain Score       04/29/18 2037       No Pain           Orthostatic Vital Signs  Vitals:    04/29/18 2315 04/29/18 2330 04/30/18 0000 04/30/18 0121   BP: 126/73 144/65 142/68 126/67 Pulse: 96 99 99 83   Patient Position - Orthostatic VS:    Lying       Physical Exam   Constitutional: He appears well-developed and well-nourished  obese   HENT:   Mouth/Throat: Oropharynx is clear and moist    Eyes: Conjunctivae and EOM are normal  Pupils are equal, round, and reactive to light  Neck: Normal range of motion  Neck supple  No spinous process tenderness present  Cardiovascular: Normal rate, normal heart sounds and intact distal pulses  An irregularly irregular rhythm present  Pulmonary/Chest: Effort normal  No respiratory distress  He has no wheezes  He has rales in the right lower field and the left lower field  Gets sob with 1 or 2 words   Abdominal: Soft  Bowel sounds are normal  He exhibits no distension  There is no tenderness  Musculoskeletal: Normal range of motion  Generalized weakness   Neurological: He is alert  He has normal strength  No cranial nerve deficit or sensory deficit  He displays a negative Romberg sign  GCS eye subscore is 4  GCS verbal subscore is 5  GCS motor subscore is 6  Oriented X person and date   Skin: Skin is warm and dry  No rash noted  Psychiatric: He has a normal mood and affect  Nursing note and vitals reviewed        ED Medications  Medications   potassium chloride (K-DUR,KLOR-CON) CR tablet 20 mEq (20 mEq Oral Not Given 4/29/18 2228)   magnesium sulfate 2 g/50 mL IVPB (premix) 2 g (not administered)   acetaminophen (TYLENOL) tablet 650 mg (not administered)   atorvastatin (LIPITOR) tablet 40 mg (not administered)   carbidopa-levodopa (SINEMET)  mg per tablet 1 tablet (not administered)   cholecalciferol (VITAMIN D3) tablet 2,000 Units (not administered)   DULoxetine (CYMBALTA) delayed release capsule 20 mg (not administered)   ezetimibe (ZETIA) tablet 10 mg (not administered)   fluticasone-salmeterol (ADVAIR) 250-50 mcg/dose inhaler 1 puff (not administered)   levothyroxine tablet 50 mcg (not administered)   magnesium oxide (MAG-OX) tablet 400 mg (not administered)   metoprolol tartrate (LOPRESSOR) tablet 25 mg (not administered)   pantoprazole (PROTONIX) EC tablet 40 mg (not administered)   potassium chloride (K-DUR,KLOR-CON) CR tablet 20 mEq (not administered)   tiotropium (SPIRIVA) capsule for inhaler 18 mcg (not administered)   warfarin (COUMADIN) tablet 2 5 mg (not administered)   furosemide (LASIX) injection 40 mg (not administered)   insulin lispro (HumaLOG) 100 units/mL subcutaneous injection 1-5 Units (not administered)   insulin lispro (HumaLOG) 100 units/mL subcutaneous injection 1-5 Units (not administered)   buPROPion (WELLBUTRIN SR) 12 hr tablet 150 mg (not administered)   dextrose 50 % IV solution 50 mL (50 mL Intravenous Given 4/29/18 2130)   furosemide (LASIX) injection 40 mg (40 mg Intravenous Given 4/29/18 2244)   potassium chloride 20 mEq IVPB (premix) (20 mEq Intravenous New Bag 4/29/18 2232)       Diagnostic Studies  Results Reviewed     Procedure Component Value Units Date/Time    Urine Microscopic [[id number]]  (Abnormal) Collected:  04/29/18 2326    Lab Status:  Final result Specimen:  Urine from Urine, Clean Catch Updated:  04/30/18 0026     RBC, UA 2-4 (A) /hpf      WBC, UA None Seen /hpf      Epithelial Cells Occasional /hpf      Bacteria, UA Occasional /hpf     UA w Reflex to Microscopic w Reflex to Culture [[id number]]  (Abnormal) Collected:  04/29/18 2326    Lab Status:  Final result Specimen:  Urine from Urine, Clean Catch Updated:  04/29/18 2348     Color, UA Yellow     Clarity, UA Clear     Specific Gravity, UA 1 015     pH, UA 7 0     Leukocytes, UA Negative     Nitrite, UA Negative     Protein, UA Negative mg/dl      Glucose, UA Negative mg/dl      Ketones, UA Trace (A) mg/dl      Urobilinogen, UA 2 0 (A) E U /dl      Bilirubin, UA Negative     Blood, UA Small (A)    TSH [[id number]]  (Normal) Collected:  04/29/18 2129    Lab Status:  Final result Specimen:  Blood from Arm, Right Updated:  04/29/18 2209     TSH 3RD SUSAN 3 299 uIU/mL     Narrative:         Patients undergoing fluorescein dye angiography may retain small amounts of fluorescein in the body for 48-72 hours post procedure  Samples containing fluorescein can produce falsely depressed TSH values  If the patient had this procedure,a specimen should be resubmitted post fluorescein clearance  Magnesium [[id number]]  (Abnormal) Collected:  04/29/18 2129    Lab Status:  Final result Specimen:  Blood from Arm, Right Updated:  04/29/18 2209     Magnesium 1 3 (L) mg/dL     B-type natriuretic peptide [[id number]]  (Abnormal) Collected:  04/29/18 2129    Lab Status:  Final result Specimen:  Blood from Arm, Right Updated:  04/29/18 2206     NT-proBNP 4,909 (H) pg/mL     Comprehensive metabolic panel [[id number]]  (Abnormal) Collected:  04/29/18 2129    Lab Status:  Final result Specimen:  Blood from Arm, Right Updated:  04/29/18 2204     Sodium 146 (H) mmol/L      Potassium 2 9 (L) mmol/L      Chloride 104 mmol/L      CO2 36 (H) mmol/L      Anion Gap 6 mmol/L      BUN 19 mg/dL      Creatinine 1 14 mg/dL      Glucose 52 (L) mg/dL      Calcium 7 7 (L) mg/dL      AST 16 U/L      ALT 8 (L) U/L      Alkaline Phosphatase 119 (H) U/L      Total Protein 6 5 g/dL      Albumin 2 7 (L) g/dL      Total Bilirubin 1 20 (H) mg/dL      eGFR 63 ml/min/1 73sq m     Narrative:         National Kidney Disease Education Program recommendations are as follows:  GFR calculation is accurate only with a steady state creatinine  Chronic Kidney disease less than 60 ml/min/1 73 sq  meters  Kidney failure less than 15 ml/min/1 73 sq  meters      Troponin I [[id number]]  (Normal) Collected:  04/29/18 2129    Lab Status:  Final result Specimen:  Blood from Arm, Right Updated:  04/29/18 2202     Troponin I <0 02 ng/mL     Narrative:         [employer] Chemistry analyzer 99% cutoff is > 0 04 ng/mL in network labs    o cTnI 99% cutoff is useful only when applied to patients in the clinical setting of myocardial ischemia  o cTnI 99% cutoff should be interpreted in the context of clinical history, ECG findings and possibly cardiac imaging to establish correct diagnosis  o cTnI 99% cutoff may be suggestive but clearly not indicative of a coronary event without the clinical setting of myocardial ischemia      Protime-INR [[id number]]  (Abnormal) Collected:  04/29/18 2129    Lab Status:  Final result Specimen:  Blood from Arm, Right Updated:  04/29/18 2157     Protime 25 9 (H) seconds      INR 2 38 (H)    APTT [[id number]]  (Abnormal) Collected:  04/29/18 2129    Lab Status:  Final result Specimen:  Blood from Arm, Right Updated:  04/29/18 2157     PTT 46 (H) seconds     Fingerstick Glucose (POCT) [[id number]]  (Abnormal) Collected:  04/29/18 2147    Lab Status:  Final result Updated:  04/29/18 2148     POC Glucose 180 (H) mg/dl     CBC and differential [[id number]]  (Abnormal) Collected:  04/29/18 2129    Lab Status:  Final result Specimen:  Blood from Arm, Right Updated:  04/29/18 2144     WBC 8 64 Thousand/uL      RBC 3 43 (L) Million/uL      Hemoglobin 10 1 (L) g/dL      Hematocrit 33 8 (L) %      MCV 99 (H) fL      MCH 29 4 pg      MCHC 29 9 (L) g/dL      RDW 15 2 (H) %      MPV 10 4 fL      Platelets 480 Thousands/uL      Neutrophils Relative 78 (H) %      Lymphocytes Relative 12 (L) %      Monocytes Relative 8 %      Eosinophils Relative 2 %      Basophils Relative 0 %      Neutrophils Absolute 6 66 Thousands/µL      Lymphocytes Absolute 1 05 Thousands/µL      Monocytes Absolute 0 71 Thousand/µL      Eosinophils Absolute 0 21 Thousand/µL      Basophils Absolute 0 01 Thousands/µL     Fingerstick Glucose (POCT) [[id number]]  (Abnormal) Collected:  04/29/18 2045    Lab Status:  Final result Updated:  04/29/18 2046     POC Glucose 48 (L) mg/dl                  CT head without contrast   ED Interpretation by Yamil Hollingsworth DO (04/29 2357)   No acute abnl      XR tibia fibula 2 views LEFT   ED Interpretation by Yamil Hollingsworth DO (04/29 2300)   No acute abnl      XR chest 1 view portable   ED Interpretation by Cherrie Díaz DO (04/29 2155)   chf                 Procedures  ECG 12 Lead Documentation  Date/Time: 4/29/2018 9:03 PM  Performed by: Vicki Marc by: Vahe Joiner     Indications / Diagnosis:  Sob  ECG reviewed by me, the ED Provider: yes    Patient location:  ED  Previous ECG:     Previous ECG:  Unavailable  Interpretation:     Interpretation: non-specific    Rate:     ECG rate:  98    ECG rate assessment: normal    Rhythm:     Rhythm: atrial fibrillation    Ectopy:     Ectopy: PVCs      PVCs:  Frequent  QRS:     QRS axis:  Normal    QRS intervals:  Normal  Conduction:     Conduction: normal    ST segments:     ST segments:  Normal  T waves:     T waves: normal               Phone Contacts  ED Phone Contact    ED Course  ED Course as of Apr 30 0154   Sun Apr 29, 2018   2112 Patient does take insulin when his sugars are high  He also may be in acute CHF exacerbation  Will get CXR and BNP, give lasix and consider admission    2231 Patient states that he is full code  Patient failed swallow eval   Patient agreeable for me to call emergency contact  2232 Wife Willian Saldaña states he has been confused and weak over last week  She is having knee replacement tomorrow      2234 Wife states he is DNR DNI                                MDM  Number of Diagnoses or Management Options  CHF (congestive heart failure) ([facility] ): new and requires workup  Hypokalemia: new and requires workup  Hypomagnesemia: new and requires workup     Amount and/or Complexity of Data Reviewed  Clinical lab tests: ordered and reviewed  Tests in the radiology section of CPT®: ordered and reviewed  Discuss the patient with other providers: yes    Patient Progress  Patient progress: improved    CritCare Time    Disposition  Final diagnoses:   CHF (congestive heart failure) ([facility] ) - acute exacerbation with hypoxia   Hypokalemia   Hypomagnesemia     Time reflects when diagnosis was documented in both MDM as applicable and the Disposition within this note     Time User Action Codes Description Comment    4/29/2018 11:55 PM Evan Media Add [I50 9] CHF (congestive heart failure) ([facility] )     4/29/2018 11:55 PM Evan Media Modify [I50 9] CHF (congestive heart failure) ([place] [address] ) acute exacerbation    4/29/2018 11:55 PM Evan Media Modify [I50 9] CHF (congestive heart failure) ([place] [address] ) acute exacerbation with hypoxia    4/29/2018 11:56 PM Evan Media Add [E87 6] Hypokalemia     4/29/2018 11:56 PM Evan Media Add [E83 42] Hypomagnesemia       ED Disposition     ED Disposition Condition Comment    Admit  Case was discussed with *TAYLOR Odonnell NP** and the patient's admission status was agreed to be Admission Status: inpatient status to the service of Dr Shadi Santillan           Follow-up Information    None       Current Discharge Medication List      CONTINUE these medications which have NOT CHANGED    Details   acetaminophen (TYLENOL) 325 mg tablet Take 650 mg by mouth every 4 (four) hours as needed for mild pain      atorvastatin (LIPITOR) 40 mg tablet Take 40 mg by mouth daily      bisacodyl (DULCOLAX) 5 mg EC tablet Take 10 mg by mouth daily as needed for constipation      buPROPion (ZYBAN) 150 MG 12 hr tablet Take 150 mg by mouth 2 (two) times a day      carbidopa-levodopa (SINEMET)  mg per tablet Take 1 tablet by mouth every 6 (six) hours      Cholecalciferol (VITAMIN D3 PO) Take 2,000 Units by mouth      DULoxetine (CYMBALTA) 30 mg delayed release capsule Take 20 mg by mouth 2 (two) times a day      ezetimibe (ZETIA) 10 mg tablet Take 10 mg by mouth daily      fluticasone-salmeterol (ADVAIR) 250-50 mcg/dose inhaler Inhale 1 puff every 12 (twelve) hours      furosemide (LASIX) 20 mg tablet Take 20 mg by mouth daily      Insulin Glargine (BASAGLAR KWIKPEN SC) Inject 20 Units under the skin      levothyroxine 50 mcg tablet Take 50 mcg by mouth daily      Liraglutide (VICTOZA) 18 MG/3ML SOPN Inject 1 8 mg under the skin      magnesium hydroxide (MILK OF MAGNESIA) 400 mg/5 mL oral suspension Take by mouth daily as needed for constipation      Magnesium Oxide 400 MG CAPS Take by mouth daily      metoprolol tartrate (LOPRESSOR) 50 mg tablet Take 25 mg by mouth 2 (two) times a day      pantoprazole (PROTONIX) 40 mg tablet Take 40 mg by mouth daily      potassium chloride (K-DUR,KLOR-CON) 20 mEq tablet Take 20 mEq by mouth 2 (two) times a day      Tiotropium Bromide Monohydrate (SPIRIVA HANDIHALER IN) Inhale 18 mcg      warfarin (COUMADIN) 2 5 mg tablet Take by mouth daily           No discharge procedures on file      ED Provider  Electronically Signed by           Tiffanie Sierra DO  04/30/18 1546

## 2018-04-30 NOTE — H&P
H&P- Aurea Delong 1945, 74 y o  male MRN: [id number]    Unit/Bed#: [address] [place] [address] Encounter: [id number]    Primary Care Provider: Skyler Flores MD   Date and time admitted to hospital: 4/29/2018  8:34 PM        * Acute respiratory failure with hypoxia [facility])   Assessment & Plan    Administer O2 and titrate to keep SPO2>92%        CHF (congestive heart failure) ([facility] )   Assessment & Plan    BNP 4909 in ED  CXR shows CHF  Will change Lasix 20mg PO daily to 40mg IV BID  Monitor I/O and daily weights  Will order ECHO for tomorrow  Hypomagnesemia   Assessment & Plan    Mag 1 3 in ED  Repleted with 2gm Mag Sulfate  Continue mag ox 400mg daily  Will recheck Mag level in am          Hypokalemia   Assessment & Plan    Potassium 2 9 in ED  Pt given 40meq of IV potassium  Continue 20meq KCL BID  Will recheck BMP in am         Diabetes mellitus with hypoglycemia ([place] [address] )   Assessment & Plan    Pt had glucose of 52 in ED  Given 1 amp of D50  Will hold Lantus and Victoza for now  Monitor blood glucose QAC and HS and initiate SSI  Anemia   Assessment & Plan    Hgb 10 1 in ED  No obvious bleeding noted  Will check stool for occult blood  Will check iron panel  Monitor CBC and transfuse as needed  Parkinson disease [facility])   Assessment & Plan    Continue Sinemet  Atrial fibrillation ([facility] )   Assessment & Plan    Currently afib ranging from 80-90s  Continue metoprolol  Continue coumadin for now  Monitor daily INR  Pt is scheduled for BEA on 5/2  For possible ablation vs pacemaker  Hypertension   Assessment & Plan    BP controlled  Continue metoprolol 25mg BID  Monitor BP per nursing unit  COPD (chronic obstructive pulmonary disease) ([facility] )   Assessment & Plan    Pt had some wheezing noted on auscultation  Continue Advair and spiriva  Obese   Assessment & Plan    Inpatient consult to nutrition             VTE Prophylaxis: Warfarin (Coumadin)  / sequential compression device   Code Status: Full code  POLST: There is no POLST form on file for this patient (pre-hospital)  Discussion with family: No family present at time of admission    Anticipated Length of Stay:  Patient will be admitted on an Inpatient basis with an anticipated length of stay of  > 2 midnights  Justification for Hospital Stay: IV medications     Total Time for Visit, including Counseling / Coordination of Care: 30 minutes  Greater than 50% of this total time spent on direct patient counseling and coordination of care  Chief Complaint:   Shortness of breath    History of Present Illness:    Meir Figueroa is a 74 y o  male with history of afib, htn, CHF, COPD, and Parkinson's who presents from [facility] with increasing SOB  SPO2 in mid 80s on RA  Placed on O2 at 2L NC and sats up in mid 90s  Pt is lethargic and speech is somewhat garbled at time of admission  Pt states that he has been short of breath for several hours  Denies fever or chills  No chest pain or palpitations  Denies lightheadedness or dizziness  Pt is diabetic and receives Lantus insulin and Victoza  Blood glucose via ambulance crew was 51  Pt was given 1 amp of D50 and blood glucose increased to 180  Upon arrival to floor blood glucose was 74  In ED BNP-4909, Mg-1 3, Na-146, K-2 9, glucose-52, INR-2 38, and hgb-10 1  CXR shows CHF  Pt was recently admitted to [facility] for rehab due to UTI and worsening Parkinson's  He was scheduled for [facility] on 5/2 for possible ablation vs pacemaker  Review of Systems:    Review of Systems   Constitutional: Positive for activity change and appetite change  Negative for chills, fever and unexpected weight change  Respiratory: Positive for shortness of breath  Negative for apnea and cough  Cardiovascular: Negative for chest pain, palpitations and leg swelling  Gastrointestinal: Negative for abdominal distention, abdominal pain, constipation, diarrhea, nausea and vomiting  Genitourinary: Negative for dysuria  Neurological: Negative for dizziness, seizures, syncope, facial asymmetry, weakness, light-headedness, numbness and headaches  Psychiatric/Behavioral: Negative for agitation and confusion  The patient is not nervous/anxious  All other systems reviewed and are negative  Past Medical and Surgical History:     Past Medical History:   Diagnosis Date    Atrial fibrillation ([place] [address] )     BPH (benign prostatic hyperplasia)     CAD (coronary artery disease)     [facility]congest[facility]) [facility]     COPD (chronic obstructive pulmonary disease) ([facility])     Diabetes mellitus ([place] [address] )     Disease of thyroid gland     HYPO    Hyperlipidemia     Hypertension     Metabolic encephalopathy     Obese     Parkinson disease ([place] [address] )        No past surgical history on file  Meds/Allergies:    Prior to Admission medications    Medication Sig Start Date End Date Taking?  Authorizing Provider   acetaminophen (TYLENOL) 325 mg tablet Take 650 mg by mouth every 4 (four) hours as needed for mild pain   Yes Historical Provider, MD   atorvastatin (LIPITOR) 40 mg tablet Take 40 mg by mouth daily   Yes Historical Provider, MD   bisacodyl (DULCOLAX) 5 mg EC tablet Take 10 mg by mouth daily as needed for constipation   Yes Historical Provider, MD   buPROPion (ZYBAN) 150 MG 12 hr tablet Take 150 mg by mouth 2 (two) times a day   Yes Historical Provider, MD   carbidopa-levodopa (SINEMET)  mg per tablet Take 1 tablet by mouth every 6 (six) hours   Yes Historical Provider, MD   Cholecalciferol (VITAMIN D3 PO) Take 2,000 Units by mouth   Yes Historical Provider, MD   DULoxetine (CYMBALTA) 30 mg delayed release capsule Take 20 mg by mouth 2 (two) times a day   Yes Historical Provider, MD   ezetimibe (ZETIA) 10 mg tablet Take 10 mg by mouth daily   Yes Historical Provider, MD   fluticasone-salmeterol (ADVAIR) 250-50 mcg/dose inhaler Inhale 1 puff every 12 (twelve) hours   Yes Historical Provider, MD   furosemide (LASIX) 20 mg tablet Take 20 mg by mouth daily   Yes Historical Provider, MD   Insulin Glargine (BASAGLAR KWIKPEN SC) Inject 20 Units under the skin   Yes Historical Provider, MD   levothyroxine 50 mcg tablet Take 50 mcg by mouth daily   Yes Historical Provider, MD   Liraglutide (VICTOZA) 18 MG/3ML SOPN Inject 1 8 mg under the skin   Yes Historical Provider, MD   magnesium hydroxide (MILK OF MAGNESIA) 400 mg/5 mL oral suspension Take by mouth daily as needed for constipation   Yes Historical Provider, MD   Magnesium Oxide 400 MG CAPS Take by mouth daily   Yes Historical Provider, MD   metoprolol tartrate (LOPRESSOR) 50 mg tablet Take 25 mg by mouth 2 (two) times a day   Yes Historical Provider, MD   pantoprazole (PROTONIX) 40 mg tablet Take 40 mg by mouth daily   Yes Historical Provider, MD   potassium chloride (K-DUR,KLOR-CON) 20 mEq tablet Take 20 mEq by mouth 2 (two) times a day   Yes Historical Provider, MD   Tiotropium Bromide Monohydrate (SPIRIVA HANDIHALER IN) Inhale 18 mcg   Yes Historical Provider, MD   warfarin (COUMADIN) 2 5 mg tablet Take by mouth daily   Yes Historical Provider, MD     I have reveiwed home medications using records provided by [facility]  Allergies: Allergies   Allergen Reactions    Ace Inhibitors     Penicillins     Vytorin [Ezetimibe-Simvastatin]        Social History:     Marital Status: /Civil Union   Occupation: Retired  Patient Pre-hospital Living Situation:  At [facility] for rehab  Patient Pre-hospital Level of Mobility: Uses roller walker with assist of 2  Patient Pre-hospital Diet Restrictions: None  Substance Use History:   History   Alcohol Use No     History   Smoking Status    Unknown If Ever Smoked   Smokeless Tobacco    Not on file     History   Drug Use No       Family History:    non-contributory    Physical Exam:     Vitals:   Blood Pressure: 126/67 (04/30/18 0121)  Pulse: 83 (04/30/18 0121)  Temperature: 98 2 °F (36 8 °C) (04/30/18 0121)  Temp Source: Axillary (04/30/18 0121)  Respirations: 18 (04/30/18 0121)  Weight - Scale: 129 kg (284 lb 6 3 oz) (04/30/18 0121)  SpO2: 97 % (04/30/18 0121)    Physical Exam   Constitutional: He is oriented to person, place, and time  He appears well-developed and well-nourished  He appears lethargic  He is easily aroused  No distress  Nasal cannula in place  Obese   HENT:   Head: Normocephalic and atraumatic  Eyes: EOM are normal  Pupils are equal, round, and reactive to light  Neck: Normal range of motion  Neck supple  Cardiovascular: Normal rate, normal heart sounds and intact distal pulses  An irregularly irregular rhythm present  Exam reveals no gallop and no friction rub  No murmur heard  Pulmonary/Chest: Effort normal  No respiratory distress  He has wheezes  He has rales in the right lower field and the left lower field  Abdominal: Soft  Bowel sounds are normal  He exhibits no distension  There is no tenderness  Musculoskeletal: Normal range of motion  He exhibits no edema  Neurological: He is oriented to person, place, and time and easily aroused  He appears lethargic  Skin: Skin is warm and dry  Psychiatric: He has a normal mood and affect  Judgment normal    Nursing note and vitals reviewed  Additional Data:     Lab Results: I have personally reviewed pertinent reports          Results from last 7 days  Lab Units 04/29/18 2129   WBC Thousand/uL 8 64   HEMOGLOBIN g/dL 10 1*   HEMATOCRIT % 33 8*   PLATELETS Thousands/uL 236   NEUTROS PCT % 78*   LYMPHS PCT % 12*   MONOS PCT % 8   EOS PCT % 2       Results from last 7 days  Lab Units 04/29/18 2129   SODIUM mmol/L 146*   POTASSIUM mmol/L 2 9*   CHLORIDE mmol/L 104   CO2 mmol/L 36*   BUN mg/dL 19   CREATININE mg/dL 1 14   CALCIUM mg/dL 7 7*   TOTAL PROTEIN g/dL 6 5   BILIRUBIN TOTAL mg/dL 1 20*   ALK PHOS U/L 119*   ALT U/L 8*   AST U/L 16   GLUCOSE RANDOM mg/dL 52*       Results from last 7 days  Lab Units 04/29/18 2129   INR  2 38*       Results from last 7 days  Lab Units 04/30/18  0148 04/29/18 2147 04/29/18 2045   POC GLUCOSE mg/dl 74 180* 48*           Imaging: I have personally reviewed pertinent reports  and I have personally reviewed pertinent films in PACS    CT head without contrast   ED Interpretation by Tiffanie Sierra DO (04/29 2358)   No acute abnl      XR tibia fibula 2 views LEFT   ED Interpretation by Tiffanie Sierra DO (04/29 2300)   No acute abnl      XR chest 1 view portable   ED Interpretation by Tiffanie Sierra DO (04/29 2155)   chf          EKG, Pathology, and Other Studies Reviewed on Admission:   · EKG: Atrial fibrillation    Allscripts / Epic Records Reviewed: Yes     ** Please Note: This note has been constructed using a voice recognition system   **

## 2018-05-01 ENCOUNTER — APPOINTMENT (INPATIENT)
Dept: NON INVASIVE DIAGNOSTICS | Facility: HOSPITAL | Age: 73
DRG: 293 | End: 2018-05-01
Payer: COMMERCIAL

## 2018-05-01 LAB
GLUCOSE SERPL-MCNC: 111 MG/DL (ref 65–140)
GLUCOSE SERPL-MCNC: 166 MG/DL (ref 65–140)
GLUCOSE SERPL-MCNC: 191 MG/DL (ref 65–140)
GLUCOSE SERPL-MCNC: 192 MG/DL (ref 65–140)
POTASSIUM SERPL-SCNC: 3.8 MMOL/L (ref 3.5–5.3)

## 2018-05-01 PROCEDURE — C8929 TTE W OR WO FOL WCON,DOPPLER: HCPCS

## 2018-05-01 PROCEDURE — 84132 ASSAY OF SERUM POTASSIUM: CPT | Performed by: HOSPITALIST

## 2018-05-01 PROCEDURE — 82948 REAGENT STRIP/BLOOD GLUCOSE: CPT

## 2018-05-01 PROCEDURE — 99232 SBSQ HOSP IP/OBS MODERATE 35: CPT | Performed by: HOSPITALIST

## 2018-05-01 PROCEDURE — 99222 1ST HOSP IP/OBS MODERATE 55: CPT | Performed by: INTERNAL MEDICINE

## 2018-05-01 PROCEDURE — 94640 AIRWAY INHALATION TREATMENT: CPT

## 2018-05-01 PROCEDURE — 94760 N-INVAS EAR/PLS OXIMETRY 1: CPT

## 2018-05-01 RX ADMIN — WARFARIN SODIUM 2.5 MG: 2.5 TABLET ORAL at 18:06

## 2018-05-01 RX ADMIN — DULOXETINE HYDROCHLORIDE 20 MG: 20 CAPSULE, DELAYED RELEASE ORAL at 18:14

## 2018-05-01 RX ADMIN — VITAMIN D, TAB 1000IU (100/BT) 2000 UNITS: 25 TAB at 08:47

## 2018-05-01 RX ADMIN — FUROSEMIDE 40 MG: 10 INJECTION, SOLUTION INTRAVENOUS at 18:06

## 2018-05-01 RX ADMIN — LEVOTHYROXINE SODIUM 50 MCG: 25 TABLET ORAL at 06:36

## 2018-05-01 RX ADMIN — PANTOPRAZOLE SODIUM 40 MG: 40 TABLET, DELAYED RELEASE ORAL at 06:36

## 2018-05-01 RX ADMIN — DULOXETINE HYDROCHLORIDE 20 MG: 20 CAPSULE, DELAYED RELEASE ORAL at 08:48

## 2018-05-01 RX ADMIN — TIOTROPIUM BROMIDE 18 MCG: 18 CAPSULE ORAL; RESPIRATORY (INHALATION) at 07:53

## 2018-05-01 RX ADMIN — EZETIMIBE 10 MG: 10 TABLET ORAL at 08:47

## 2018-05-01 RX ADMIN — POTASSIUM CHLORIDE 20 MEQ: 1500 TABLET, EXTENDED RELEASE ORAL at 08:47

## 2018-05-01 RX ADMIN — BUPROPION HYDROCHLORIDE 150 MG: 150 TABLET, EXTENDED RELEASE ORAL at 18:14

## 2018-05-01 RX ADMIN — CARBIDOPA AND LEVODOPA 1 TABLET: 25; 100 TABLET ORAL at 12:29

## 2018-05-01 RX ADMIN — FLUTICASONE PROPIONATE AND SALMETEROL 1 PUFF: 50; 250 POWDER RESPIRATORY (INHALATION) at 07:53

## 2018-05-01 RX ADMIN — BUPROPION HYDROCHLORIDE 150 MG: 150 TABLET, EXTENDED RELEASE ORAL at 08:47

## 2018-05-01 RX ADMIN — INSULIN LISPRO 1 UNITS: 100 INJECTION, SOLUTION INTRAVENOUS; SUBCUTANEOUS at 12:29

## 2018-05-01 RX ADMIN — INSULIN LISPRO 1 UNITS: 100 INJECTION, SOLUTION INTRAVENOUS; SUBCUTANEOUS at 21:51

## 2018-05-01 RX ADMIN — METOPROLOL TARTRATE 25 MG: 25 TABLET ORAL at 18:05

## 2018-05-01 RX ADMIN — METOPROLOL TARTRATE 25 MG: 25 TABLET ORAL at 08:47

## 2018-05-01 RX ADMIN — PERFLUTREN 1 ML/MIN: 6.52 INJECTION, SUSPENSION INTRAVENOUS at 15:38

## 2018-05-01 RX ADMIN — FLUTICASONE PROPIONATE AND SALMETEROL 1 PUFF: 50; 250 POWDER RESPIRATORY (INHALATION) at 20:20

## 2018-05-01 RX ADMIN — POTASSIUM CHLORIDE 20 MEQ: 1500 TABLET, EXTENDED RELEASE ORAL at 18:06

## 2018-05-01 RX ADMIN — CARBIDOPA AND LEVODOPA 1 TABLET: 25; 100 TABLET ORAL at 01:01

## 2018-05-01 RX ADMIN — Medication 400 MG: at 08:47

## 2018-05-01 RX ADMIN — CARBIDOPA AND LEVODOPA 1 TABLET: 25; 100 TABLET ORAL at 06:36

## 2018-05-01 RX ADMIN — ATORVASTATIN CALCIUM 40 MG: 40 TABLET, FILM COATED ORAL at 18:06

## 2018-05-01 RX ADMIN — CARBIDOPA AND LEVODOPA 1 TABLET: 25; 100 TABLET ORAL at 21:51

## 2018-05-01 RX ADMIN — FUROSEMIDE 40 MG: 10 INJECTION, SOLUTION INTRAVENOUS at 08:47

## 2018-05-01 RX ADMIN — INSULIN LISPRO 1 UNITS: 100 INJECTION, SOLUTION INTRAVENOUS; SUBCUTANEOUS at 18:13

## 2018-05-01 NOTE — PROGRESS NOTES
Progress Note Katie Haas 1945, 74 y o  male MRN: [id number]    Unit/Bed#: [address] [place] [address] Encounter: [id number]    Primary Care Provider: Rubin Agarwal MD   Date and time admitted to hospital: 4/29/2018  8:34 PM        Anemia   Assessment & Plan    -iron panel unremarkable  -suspect anemia of chronic disease  -continue trend hemoglobin        Hypokalemia   Assessment & Plan    -recheck potassium now  -repeat BMP in the morning        Hypomagnesemia   Assessment & Plan    -continue oral magnesium        Permanent atrial fibrillation (HCC)   Assessment & Plan    -continue Coumadin, INR therapeutic  -continue beta-blocker        CHF (congestive heart failure) ([facility])   Assessment & Plan    -see above  -echo pending        COPD (chronic obstructive pulmonary disease) ([facility] )   Assessment & Plan    -continue Spiriva and Advair        Diabetes mellitus with hypoglycemia ([facility])   Assessment & Plan    -continue sliding scale insulin if needed        Hypertension   Assessment & Plan    -continue beta-blocker        Obesity due to excess calories   Assessment & Plan    -encourage weight loss        Parkinson disease ([facility] )   Assessment & Plan    -continue Sinemet        * Acute respiratory failure with hypoxia ([facility])   Assessment & Plan    -due to acute congestive heart failure exacerbation  -Echocardiogram read pending  -continue IV Lasix  -consult Cardiology          VTE Pharmacologic Prophylaxis:   Pharmacologic: Warfarin (Coumadin)  Mechanical VTE Prophylaxis in Place: Yes    Patient Centered Rounds: I have performed bedside rounds with nursing staff today  Education and Discussions with Family / Patient:  Patient    Time Spent for Care: 20 minutes  More than 50% of total time spent on counseling and coordination of care as described above      Current Length of Stay: 2 day(s)    Current Patient Status: Inpatient   Certification Statement: The patient will continue to require additional inpatient hospital stay due to CHF exacerbation    Discharge Plan:  2-3 days    Code Status: Level 1 - Full Code      Subjective:   Patient reports he is less short of breath today  Objective:     Vitals:   Temp (24hrs), Av 8 °F (36 6 °C), Min:97 7 °F (36 5 °C), Max:98 °F (36 7 °C)    HR:  [100-110] 110  Resp:  [18-20] 20  BP: (119-123)/(63-79) 120/79  SpO2:  [93 %-97 %] 97 %  There is no height or weight on file to calculate BMI  Input and Output Summary (last 24 hours):        Intake/Output Summary (Last 24 hours) at 18 1135  Last data filed at 18 0640   Gross per 24 hour   Intake                0 ml   Output             2225 ml   Net            -2225 ml       Physical Exam:     Physical Exam  Gen: NAD, awake alert and oriented times 2-3, well developed, well nourished  Eyes: EOMI, PERRLA, no scleral icterus  ENMT:  Oropharynx clear of erythema or exudates, no nasal discharge, no otic discharge, moist mucous membranes  Neck:  Supple  Lymph:  No anterior or posterior cervical or supraclavicular lymphadenopathy  Cardiovascular: tachy, Irregularly irregular rhythm, normal rate, normal S1-S2, no murmurs, rubs, or gallops  Lungs:   clear to auscultation anteriorly and laterally, no wheezes, or rales, or rhonchi  Abdomen:  Positive bowel sounds, soft, nontender, nondistended, no palpable organomegaly   Skin:  Intact, no obvious lesions or rashes, trace lower extremity edema  Neuro: Cranial nerves 2-12 are intact, non-focal    Additional Data:     Labs:      Results from last 7 days  Lab Units 18  0504 18  2129   WBC Thousand/uL 7 62 8 64   HEMOGLOBIN g/dL 9 5* 10 1*   HEMATOCRIT % 32 1* 33 8*   PLATELETS Thousands/uL 222 236   NEUTROS PCT %  --  78*   LYMPHS PCT %  --  12*   MONOS PCT %  --  8   EOS PCT %  --  2       Results from last 7 days  Lab Units 18  1714  18  2129   SODIUM mmol/L 145  < > 146*   POTASSIUM mmol/L 3 4*  < > 2 9*   CHLORIDE mmol/L 105  < > 104   CO2 mmol/L 35*  < > 36*   BUN mg/dL 18  < > 19   CREATININE mg/dL 1 04  < > 1 14   CALCIUM mg/dL 8 1*  < > 7 7*   TOTAL PROTEIN g/dL  --   --  6 5   BILIRUBIN TOTAL mg/dL  --   --  1 20*   ALK PHOS U/L  --   --  119*   ALT U/L  --   --  8*   AST U/L  --   --  16   GLUCOSE RANDOM mg/dL 88  < > 52*   < > = values in this interval not displayed  Results from last 7 days  Lab Units 04/30/18  0503   INR  2 44*       Results from last 7 days  Lab Units 05/01/18  1114 05/01/18  0610 04/30/18  2140 04/30/18  1647 04/30/18  1153 04/30/18  0637 04/30/18  0148 04/29/18  2147 04/29/18  2045   POC GLUCOSE mg/dl 192* 111 121 89 88 76 74 180* 48*             * I Have Reviewed All Lab Data Listed Above  * Additional Pertinent Lab Tests Reviewed:  [facility]    Recent Cultures (last 7 days):           Last 24 Hours Medication List:     Current Facility-Administered Medications:  acetaminophen 650 mg Oral Q4H PRN ANTON Arango   atorvastatin 40 mg Oral Daily With [employer], ANTON   buPROPion 150 mg Oral BID ANTON Arango   carbidopa-levodopa 1 tablet Oral Q6H ANTON Arango   cholecalciferol 2,000 Units Oral Daily ANTON Hodges   DULoxetine 20 mg Oral BID ANTON Arango   ezetimibe 10 mg Oral Daily ANTON Arango   fluticasone-salmeterol 1 puff Inhalation Q12H [facility] ANTON Hodges   furosemide 40 mg Intravenous BID (diuretic) ANTON Arango   insulin lispro 1-5 Units Subcutaneous TID AC ANTON Hodges   insulin lispro 1-5 Units Subcutaneous HS ANTON Hodges   levothyroxine 50 mcg Oral Early Morning ANTON Hodges   magnesium oxide 400 mg Oral Daily ANTON Hodges   metoprolol tartrate 25 mg Oral BID ANTON Hodges   pantoprazole 40 mg Oral Daily Before Breakfast ANTON Hodges   potassium chloride 20 mEq Oral Once Barbara Nguyen DO   potassium chloride 20 mEq Oral BID ANTON Arango tiotropium 18 mcg Inhalation Daily ANTON Ortiz   warfarin 2 5 mg Oral Daily ANTON Veloz        Today, Patient Was Seen By: Sue Roberts MD    ** Please Note: Dictation voice to text software may have been used in the creation of this document   **

## 2018-05-01 NOTE — PLAN OF CARE
CARDIOVASCULAR - ADULT     Maintains optimal cardiac output and hemodynamic stability Progressing     Absence of cardiac dysrhythmias or at baseline rhythm Progressing        DISCHARGE PLANNING     Discharge to home or other facility with appropriate resources Progressing        DISCHARGE PLANNING - CARE MANAGEMENT     Discharge to post-acute care or home with appropriate resources Progressing        INFECTION - ADULT     Absence or prevention of progression during hospitalization Progressing     Absence of fever/infection during neutropenic period Progressing        Knowledge Deficit     Patient/family/caregiver demonstrates understanding of disease process, treatment plan, medications, and discharge instructions Progressing        METABOLIC, FLUID AND ELECTROLYTES - ADULT     Electrolytes maintained within normal limits Progressing     Fluid balance maintained Progressing     Glucose maintained within target range Progressing        MUSCULOSKELETAL - ADULT     Maintain or return mobility to safest level of function Progressing     Maintain proper alignment of affected body part Progressing        NEUROSENSORY - ADULT     Achieves stable or improved neurological status Progressing     Achieves maximal functionality and self care Progressing        Nutrition/Hydration-ADULT     Nutrient/Hydration intake appropriate for improving, restoring or maintaining nutritional needs Progressing        PAIN - ADULT     Verbalizes/displays adequate comfort level or baseline comfort level Progressing        Potential for Falls     Patient will remain free of falls Progressing        Prexisting or High Potential for Compromised Skin Integrity     Skin integrity is maintained or improved Progressing        RESPIRATORY - ADULT     Achieves optimal ventilation and oxygenation Progressing        SAFETY ADULT     Maintain or return to baseline ADL function Progressing     Maintain or return mobility status to optimal level Progressing     Patient will remain free of falls Progressing        SKIN/TISSUE INTEGRITY - ADULT     Skin integrity remains intact Progressing     Incision(s), wounds(s) or drain site(s) healing without S/S of infection Progressing     Oral mucous membranes remain intact Progressing

## 2018-05-01 NOTE — ASSESSMENT & PLAN NOTE
-due to acute congestive heart failure exacerbation  -Echocardiogram read pending  -continue IV Lasix  -consult Cardiology

## 2018-05-01 NOTE — CONSULTS
Inpatient consult to Cardiology  Consult performed by: Landon Primrose ordered by: Danilo Tompkins        Consultation - Cardiology   Nika Kohli 74 y o  male MRN: [id number]  Unit/Bed#: 2 [address]  204-02 Encounter: [id number]      Physician Requesting Consult: Marjorie Montilla MD  Reason for Consult / Principal Problem: CHF    History of Present Illness   HPI: Nika Kohli is a 74y o  year old male who has a history of Atrial Fibrillation presents with worsening shortness of breath and hypoxia  He resides at [facility]  He was found to be in clinical heart failure and admitted to the hospital  He was started on IV lasix  He is comfortable on supplemental oxygen  He currently has mild LE edema  He denies chest pain, orthopnea or PND  Review of Systems   Constitution: Positive for weakness and malaise/fatigue  HENT: Negative  Eyes: Negative  Cardiovascular: Positive for dyspnea on exertion, irregular heartbeat and leg swelling  Respiratory: Positive for shortness of breath  Hematologic/Lymphatic: Negative  Skin: Negative  Musculoskeletal: Negative  Gastrointestinal: Negative  Genitourinary: Negative  Neurological: Negative for dizziness  Psychiatric/Behavioral: Negative  Allergic/Immunologic: Negative  Historical Information   Past Medical History:   Diagnosis Date    Atrial fibrillation ([place] [address] )     BPH (benign prostatic hyperplasia)     CAD (coronary artery disease)     CHF (congestive heart failure) (HCC)     COPD (chronic obstructive pulmonary disease) ([place] [address] )     Diabetes mellitus ([place] [address] )     Disease of thyroid gland     HYPO    Hyperlipidemia     Hypertension     Metabolic encephalopathy     Obese     Parkinson disease ([place] [address] )      No past surgical history on file    History   Alcohol Use No     History   Drug Use No     History   Smoking Status    Unknown If Ever Smoked   Smokeless Tobacco    Not on file     Family History: No family history on file     Meds/Allergies   current meds:   Current Facility-Administered Medications   Medication Dose Route Frequency    acetaminophen (TYLENOL) tablet 650 mg  650 mg Oral Q4H PRN    atorvastatin (LIPITOR) tablet 40 mg  40 mg Oral Daily With Dinner    buPROPion (WELLBUTRIN SR) 12 hr tablet 150 mg  150 mg Oral BID    carbidopa-levodopa (SINEMET)  mg per tablet 1 tablet  1 tablet Oral Q6H    cholecalciferol (VITAMIN D3) tablet 2,000 Units  2,000 Units Oral Daily    DULoxetine (CYMBALTA) delayed release capsule 20 mg  20 mg Oral BID    ezetimibe (ZETIA) tablet 10 mg  10 mg Oral Daily    fluticasone-salmeterol (ADVAIR) 250-50 mcg/dose inhaler 1 puff  1 puff Inhalation Q12H [address]    furosemide (LASIX) injection 40 mg  40 mg Intravenous BID (diuretic)    insulin lispro (HumaLOG) 100 units/mL subcutaneous injection 1-5 Units  1-5 Units Subcutaneous TID AC    insulin lispro (HumaLOG) 100 units/mL subcutaneous injection 1-5 Units  1-5 Units Subcutaneous HS    levothyroxine tablet 50 mcg  50 mcg Oral Early Morning    magnesium oxide (MAG-OX) tablet 400 mg  400 mg Oral Daily    metoprolol tartrate (LOPRESSOR) tablet 25 mg  25 mg Oral BID    pantoprazole (PROTONIX) EC tablet 40 mg  40 mg Oral Daily Before Breakfast    potassium chloride (K-DUR,KLOR-CON) CR tablet 20 mEq  20 mEq Oral Once    potassium chloride (K-DUR,KLOR-CON) CR tablet 20 mEq  20 mEq Oral BID    tiotropium (SPIRIVA) capsule for inhaler 18 mcg  18 mcg Inhalation Daily    warfarin (COUMADIN) tablet 2 5 mg  2 5 mg Oral Daily          Allergies   Allergen Reactions    Ace Inhibitors     Penicillins     Vytorin [Ezetimibe-Simvastatin]        Objective   Vitals: Blood pressure 120/79, pulse (!) 110, temperature 97 7 °F (36 5 °C), temperature source Oral, resp  rate 20, weight 130 kg (286 lb 9 6 oz), SpO2 97 %  , There is no height or weight on file to calculate BMI , Orthostatic Blood Pressures      Most Recent Value   Blood Pressure  120/79 filed at 05/01/2018 0724   Patient Position - Orthostatic VS  Lying filed at 05/01/2018 9681        Systolic (95YZA), OFC:834 , Min:119 , BUY:515     Diastolic (37JSL), LOO:73, Min:63, Max:79      Intake/Output Summary (Last 24 hours) at 05/01/18 0928  Last data filed at 05/01/18 0640   Gross per 24 hour   Intake                0 ml   Output             2225 ml   Net            -2225 ml       Weight (last 2 days)     Date/Time   Weight    05/01/18 0724  130 (286 6)    04/30/18 0121  129 (284 39)              Invasive Devices     Peripheral Intravenous Line            Peripheral IV 04/29/18 Right Antecubital 1 day          Drain            Urethral Catheter Coude 16 Fr  1 day                  Physical Exam   Constitutional: He is oriented to person, place, and time  No distress  HENT:   Mouth/Throat: No oropharyngeal exudate  Eyes: No scleral icterus  Neck: No JVD present  Cardiovascular: Normal rate  An irregularly irregular rhythm present  Pulmonary/Chest: Effort normal and breath sounds normal  No respiratory distress  He has no wheezes  He has no rales  Abdominal: Soft  Bowel sounds are normal  He exhibits no distension  There is no tenderness  There is no rebound  Musculoskeletal: He exhibits edema  Neurological: He is alert and oriented to person, place, and time  Skin: Skin is warm and dry  He is not diaphoretic     Psychiatric: His behavior is normal            Laboratory Results:    Results from last 7 days  Lab Units 04/30/18  0503 04/30/18  0132 04/29/18 2129   TROPONIN I ng/mL <0 02 <0 02 <0 02       CBC with diff:   Results from last 7 days  Lab Units 04/30/18  0504 04/29/18 2129   WBC Thousand/uL 7 62 8 64   HEMOGLOBIN g/dL 9 5* 10 1*   HEMATOCRIT % 32 1* 33 8*   MCV fL 98 99*   PLATELETS Thousands/uL 222 236   MCH pg 29 1 29 4   MCHC g/dL 29 6* 29 9*   RDW % 15 2* 15 2*   MPV fL 10 5 10 4         CMP:  Results from last 7 days  Lab Units 04/30/18  1714 04/30/18  0503 04/29/18 2129 SODIUM mmol/L 145 145 146*   POTASSIUM mmol/L 3 4* 2 7* 2 9*   CHLORIDE mmol/L 105 104 104   CO2 mmol/L 35* 34* 36*   ANION GAP mmol/L 5 7 6   BUN mg/dL 18 16 19   CREATININE mg/dL 1 04 1 02 1 14   GLUCOSE RANDOM mg/dL 88 79 52*   CALCIUM mg/dL 8 1* 7 5* 7 7*   AST U/L  --   --  16   ALT U/L  --   --  8*   ALK PHOS U/L  --   --  119*   TOTAL PROTEIN g/dL  --   --  6 5   BILIRUBIN TOTAL mg/dL  --   --  1 20*   EGFR ml/min/1 73sq m 70 72 63         BMP:  Results from last 7 days  Lab Units 04/30/18  1714 04/30/18  0503 04/29/18  2129   SODIUM mmol/L 145 145 146*   POTASSIUM mmol/L 3 4* 2 7* 2 9*   CHLORIDE mmol/L 105 104 104   CO2 mmol/L 35* 34* 36*   BUN mg/dL 18 16 19   CREATININE mg/dL 1 04 1 02 1 14   GLUCOSE RANDOM mg/dL 88 79 52*   CALCIUM mg/dL 8 1* 7 5* 7 7*       BNP:  No results for input(s): BNP in the last 72 hours  Magnesium:   Results from last 7 days  Lab Units 04/30/18  0503 04/29/18  2129   MAGNESIUM mg/dL 1 6 1 3*       Coags:   Results from last 7 days  Lab Units 04/30/18  0503 04/29/18  2129   PTT seconds  --  46*   INR  2 44* 2 38*       TSH: No results found for: TSH  No results found for: TSH, O6VWUAN, H9IJNCR, THYROIDAB  Hemoglobin A1C       Lipid Profile:   Lab Results   Component Value Date    CHOL 61 04/30/2018     Lab Results   Component Value Date    HDL 33 (L) 04/30/2018     Lab Results   Component Value Date    LDLCALC 20 04/30/2018     Lab Results   Component Value Date    TRIG 39 04/30/2018     No components found for: [place], [place]    Cardiac testing:               Imaging: I have personally reviewed pertinent films in PACS  Xr Chest 1 View Portable    Result Date: 4/30/2018  Narrative: CHEST INDICATION:   CHF  COMPARISON:  None EXAM PERFORMED/VIEWS:  XR CHEST PORTABLE FINDINGS: The heart is enlarged  Atherosclerotic changes in the aorta  Lung volumes diminished  Bilateral pleural effusions, right side greater than left  Daphne and pulmonary vessels diffusely enlarged    Diffuse alveolar infiltrates throughout the lungs bilaterally  Ill-defined alveolar density in the left upper lobe Osseous structures appear within normal limits for patient age  Impression: 1  Diffuse alveolar infiltrates, most compatible with alveolar edema  2   Alveolar density left upper lobe, likely cardiogenic in nature  Recommend follow-up examination rule out pulmonary nodule  Workstation performed: [id number]     Xr Tibia Fibula 2 Views Left    Result Date: 4/30/2018  Narrative: LEFT TIBIA AND FIBULA INDICATION:   Bruising medial aspect of proximal tibia/fibula COMPARISON:  None VIEWS:  XR TIBIA FIBULA 2 VW LEFT FINDINGS: There is no acute fracture or dislocation  No degenerative changes  There are dorsal and plantar calcaneal spurs  No lytic or blastic lesions are seen  There are vascular calcifications  Multiple phleboliths are seen  Impression: No acute osseous abnormality  This report is in agreement with the preliminary interpretation  Workstation performed: [id number]     Ct Head Without Contrast    Result Date: 4/30/2018  Narrative: CT BRAIN - WITHOUT CONTRAST INDICATION:   Confusion/delirium, altered LOC, unexplained  COMPARISON:  None  TECHNIQUE:  CT examination of the brain was performed  In addition to axial images, coronal 2D reformatted images were created and submitted for interpretation  Radiation dose length product (DLP) for this visit:  1144 56 mGy-cm   This examination, like all CT scans performed in the [facility], was performed utilizing techniques to minimize radiation dose exposure, including the use of iterative reconstruction and automated exposure control  IMAGE QUALITY:  Diagnostic  FINDINGS: PARENCHYMA:  No intracranial mass, mass effect or midline shift  No CT signs of acute infarction  No acute intracranial hemorrhage    Periventricular and centrum semiovale white matter hypodensity is a nonspecific finding likely representing chronic microangiopathy  Calcification bilateral cavernous internal carotid and distal vertebral arteries  VENTRICLES AND EXTRA-AXIAL SPACES:  No acute hydrocephalus  Mild prominence of CSF spaces diffusely attributed to generalized volume loss  VISUALIZED ORBITS AND PARANASAL SINUSES:  Unremarkable  CALVARIUM AND EXTRACRANIAL SOFT TISSUES:  Normal      Impression: No CT evidence of acute intracranial process  Chronic microangiopathy  Findings are consistent with the preliminary report from [facility] which was provided shortly after completion of the exam  Workstation performed: [id number]     Radiology Results    Result Date: 4/30/2018  Narrative: Ordered by an unspecified provider  EKG reviewed personally: Atrial Fibrillation  Nonspecific ST T wave abnormality  Telemetry reviewed personally: Atrial Fibrillation  Assessment:  Principal Problem:    Acute respiratory failure with hypoxia ([facility])  Active Problems:    Parkinson disease ([facility] [address] )    Obesity due to excess calories    Hypertension    Diabetes mellitus with hypoglycemia ([facility])    COPD (chronic obstructive pulmonary disease) ([facility])    CHF (congestive heart failure) ([facility])    Permanent atrial fibrillation ([facility])    Hypomagnesemia    Hypokalemia    Anemia      Assesment/ Plan:    1  Acute Decompensated Heart Failure: He is clinically decompensated with volume overload  Followup on echocardiogram  Continue to diurese as tolerated  Trend I/O, daily weights, etc      2  Atrial Fibrillation: Continue rate control and anticoagulation with warfarin  Counseling / Coordination of Care  Total floor / unit time spent today 45 minutes  Greater than 50% of total time was spent with the patient and / or family counseling and / or coordination of care  A description of the counseling / coordination of care          Code Status: Level 1 - Full Code

## 2018-05-02 LAB
ANION GAP SERPL CALCULATED.3IONS-SCNC: 6 MMOL/L (ref 4–13)
BASOPHILS # BLD AUTO: 0.01 THOUSANDS/ΜL (ref 0–0.1)
BASOPHILS NFR BLD AUTO: 0 % (ref 0–1)
BUN SERPL-MCNC: 20 MG/DL (ref 5–25)
CALCIUM SERPL-MCNC: 8.2 MG/DL (ref 8.3–10.1)
CHLORIDE SERPL-SCNC: 103 MMOL/L (ref 100–108)
CO2 SERPL-SCNC: 33 MMOL/L (ref 21–32)
CREAT SERPL-MCNC: 1.08 MG/DL (ref 0.6–1.3)
EOSINOPHIL # BLD AUTO: 0.24 THOUSAND/ΜL (ref 0–0.61)
EOSINOPHIL NFR BLD AUTO: 3 % (ref 0–6)
ERYTHROCYTE [DISTWIDTH] IN BLOOD BY AUTOMATED COUNT: 15.2 % (ref 11.6–15.1)
GFR SERPL CREATININE-BSD FRML MDRD: 67 ML/MIN/1.73SQ M
GLUCOSE SERPL-MCNC: 141 MG/DL (ref 65–140)
GLUCOSE SERPL-MCNC: 143 MG/DL (ref 65–140)
GLUCOSE SERPL-MCNC: 216 MG/DL (ref 65–140)
GLUCOSE SERPL-MCNC: 240 MG/DL (ref 65–140)
GLUCOSE SERPL-MCNC: 242 MG/DL (ref 65–140)
HCT VFR BLD AUTO: 30.8 % (ref 36.5–49.3)
HGB BLD-MCNC: 9.1 G/DL (ref 12–17)
INR PPP: 2.54 (ref 0.86–1.16)
LYMPHOCYTES # BLD AUTO: 1.04 THOUSANDS/ΜL (ref 0.6–4.47)
LYMPHOCYTES NFR BLD AUTO: 13 % (ref 14–44)
MCH RBC QN AUTO: 28.7 PG (ref 26.8–34.3)
MCHC RBC AUTO-ENTMCNC: 29.5 G/DL (ref 31.4–37.4)
MCV RBC AUTO: 97 FL (ref 82–98)
MONOCYTES # BLD AUTO: 0.89 THOUSAND/ΜL (ref 0.17–1.22)
MONOCYTES NFR BLD AUTO: 11 % (ref 4–12)
MRSA NOSE QL CULT: ABNORMAL
MRSA NOSE QL CULT: ABNORMAL
NEUTROPHILS # BLD AUTO: 5.81 THOUSANDS/ΜL (ref 1.85–7.62)
NEUTS SEG NFR BLD AUTO: 73 % (ref 43–75)
PLATELET # BLD AUTO: 244 THOUSANDS/UL (ref 149–390)
PMV BLD AUTO: 10.8 FL (ref 8.9–12.7)
POTASSIUM SERPL-SCNC: 3.3 MMOL/L (ref 3.5–5.3)
PROTHROMBIN TIME: 27.2 SECONDS (ref 12.1–14.4)
RBC # BLD AUTO: 3.17 MILLION/UL (ref 3.88–5.62)
SODIUM SERPL-SCNC: 142 MMOL/L (ref 136–145)
WBC # BLD AUTO: 7.99 THOUSAND/UL (ref 4.31–10.16)

## 2018-05-02 PROCEDURE — G8979 MOBILITY GOAL STATUS: HCPCS

## 2018-05-02 PROCEDURE — 94760 N-INVAS EAR/PLS OXIMETRY 1: CPT

## 2018-05-02 PROCEDURE — 99232 SBSQ HOSP IP/OBS MODERATE 35: CPT | Performed by: HOSPITALIST

## 2018-05-02 PROCEDURE — G8978 MOBILITY CURRENT STATUS: HCPCS

## 2018-05-02 PROCEDURE — 97166 OT EVAL MOD COMPLEX 45 MIN: CPT

## 2018-05-02 PROCEDURE — 99232 SBSQ HOSP IP/OBS MODERATE 35: CPT | Performed by: INTERNAL MEDICINE

## 2018-05-02 PROCEDURE — 93306 TTE W/DOPPLER COMPLETE: CPT | Performed by: INTERNAL MEDICINE

## 2018-05-02 PROCEDURE — 80048 BASIC METABOLIC PNL TOTAL CA: CPT | Performed by: HOSPITALIST

## 2018-05-02 PROCEDURE — 85610 PROTHROMBIN TIME: CPT | Performed by: HOSPITALIST

## 2018-05-02 PROCEDURE — 85025 COMPLETE CBC W/AUTO DIFF WBC: CPT | Performed by: HOSPITALIST

## 2018-05-02 PROCEDURE — 97163 PT EVAL HIGH COMPLEX 45 MIN: CPT

## 2018-05-02 PROCEDURE — G8988 SELF CARE GOAL STATUS: HCPCS

## 2018-05-02 PROCEDURE — G8987 SELF CARE CURRENT STATUS: HCPCS

## 2018-05-02 PROCEDURE — 94640 AIRWAY INHALATION TREATMENT: CPT

## 2018-05-02 PROCEDURE — 82948 REAGENT STRIP/BLOOD GLUCOSE: CPT

## 2018-05-02 RX ORDER — POTASSIUM CHLORIDE 20 MEQ/1
40 TABLET, EXTENDED RELEASE ORAL ONCE
Status: COMPLETED | OUTPATIENT
Start: 2018-05-02 | End: 2018-05-02

## 2018-05-02 RX ORDER — FUROSEMIDE 10 MG/ML
80 INJECTION INTRAMUSCULAR; INTRAVENOUS
Status: DISCONTINUED | OUTPATIENT
Start: 2018-05-03 | End: 2018-05-04 | Stop reason: HOSPADM

## 2018-05-02 RX ADMIN — FLUTICASONE PROPIONATE AND SALMETEROL 1 PUFF: 50; 250 POWDER RESPIRATORY (INHALATION) at 09:52

## 2018-05-02 RX ADMIN — CARBIDOPA AND LEVODOPA 1 TABLET: 25; 100 TABLET ORAL at 12:19

## 2018-05-02 RX ADMIN — FUROSEMIDE 40 MG: 10 INJECTION, SOLUTION INTRAVENOUS at 16:43

## 2018-05-02 RX ADMIN — WARFARIN SODIUM 2.5 MG: 2.5 TABLET ORAL at 17:19

## 2018-05-02 RX ADMIN — TIOTROPIUM BROMIDE 18 MCG: 18 CAPSULE ORAL; RESPIRATORY (INHALATION) at 09:52

## 2018-05-02 RX ADMIN — METOPROLOL TARTRATE 25 MG: 25 TABLET ORAL at 17:20

## 2018-05-02 RX ADMIN — CARBIDOPA AND LEVODOPA 1 TABLET: 25; 100 TABLET ORAL at 02:11

## 2018-05-02 RX ADMIN — Medication 400 MG: at 08:53

## 2018-05-02 RX ADMIN — INSULIN LISPRO 2 UNITS: 100 INJECTION, SOLUTION INTRAVENOUS; SUBCUTANEOUS at 12:16

## 2018-05-02 RX ADMIN — METOPROLOL TARTRATE 25 MG: 25 TABLET ORAL at 08:52

## 2018-05-02 RX ADMIN — POTASSIUM CHLORIDE 20 MEQ: 1500 TABLET, EXTENDED RELEASE ORAL at 08:52

## 2018-05-02 RX ADMIN — BUPROPION HYDROCHLORIDE 150 MG: 150 TABLET, EXTENDED RELEASE ORAL at 17:21

## 2018-05-02 RX ADMIN — ATORVASTATIN CALCIUM 40 MG: 40 TABLET, FILM COATED ORAL at 16:43

## 2018-05-02 RX ADMIN — POTASSIUM CHLORIDE 20 MEQ: 1500 TABLET, EXTENDED RELEASE ORAL at 17:19

## 2018-05-02 RX ADMIN — EZETIMIBE 10 MG: 10 TABLET ORAL at 08:52

## 2018-05-02 RX ADMIN — CARBIDOPA AND LEVODOPA 1 TABLET: 25; 100 TABLET ORAL at 19:50

## 2018-05-02 RX ADMIN — FUROSEMIDE 40 MG: 10 INJECTION, SOLUTION INTRAVENOUS at 08:52

## 2018-05-02 RX ADMIN — VITAMIN D, TAB 1000IU (100/BT) 2000 UNITS: 25 TAB at 08:53

## 2018-05-02 RX ADMIN — INSULIN LISPRO 1 UNITS: 100 INJECTION, SOLUTION INTRAVENOUS; SUBCUTANEOUS at 16:44

## 2018-05-02 RX ADMIN — PANTOPRAZOLE SODIUM 40 MG: 40 TABLET, DELAYED RELEASE ORAL at 06:08

## 2018-05-02 RX ADMIN — DULOXETINE HYDROCHLORIDE 20 MG: 20 CAPSULE, DELAYED RELEASE ORAL at 17:21

## 2018-05-02 RX ADMIN — LEVOTHYROXINE SODIUM 50 MCG: 25 TABLET ORAL at 06:08

## 2018-05-02 RX ADMIN — BUPROPION HYDROCHLORIDE 150 MG: 150 TABLET, EXTENDED RELEASE ORAL at 08:53

## 2018-05-02 RX ADMIN — INSULIN LISPRO 2 UNITS: 100 INJECTION, SOLUTION INTRAVENOUS; SUBCUTANEOUS at 21:40

## 2018-05-02 RX ADMIN — CARBIDOPA AND LEVODOPA 1 TABLET: 25; 100 TABLET ORAL at 07:20

## 2018-05-02 RX ADMIN — FLUTICASONE PROPIONATE AND SALMETEROL 1 PUFF: 50; 250 POWDER RESPIRATORY (INHALATION) at 20:59

## 2018-05-02 RX ADMIN — POTASSIUM CHLORIDE 40 MEQ: 1500 TABLET, EXTENDED RELEASE ORAL at 10:53

## 2018-05-02 RX ADMIN — DULOXETINE HYDROCHLORIDE 20 MG: 20 CAPSULE, DELAYED RELEASE ORAL at 08:53

## 2018-05-02 NOTE — PLAN OF CARE
Problem: PHYSICAL THERAPY ADULT  Goal: Performs mobility at highest level of function for planned discharge setting  See evaluation for individualized goals  Treatment/Interventions: Functional transfer training, LE strengthening/ROM, Therapeutic exercise, Endurance training, Patient/family training, Equipment eval/education, Bed mobility, Gait training, Continued evaluation, Spoke to nursing, Spoke to case management, OT  Equipment Recommended: Enedelia Rolon       See flowsheet documentation for full assessment, interventions and recommendations  Outcome: Progressing  Prognosis: Fair  Problem List: Decreased strength, Decreased endurance, Impaired balance, Decreased mobility, Decreased cognition  Assessment: Pt is 74 y o  male seen for PT evaluation s/p admit to [facility] on 4/29/2018 w/ Acute respiratory failure with hypoxia ([facility] [address] )  PT consulted to assess pt's functional mobility and d/c needs  Order placed for PT eval and tx, w/ up w/ A order  Comorbidities affecting pt's physical performance at time of assessment include those as noted above  PTA, pt was at a SNF for therapy; prior to rehab pt was living at home with family  Personal factors affecting pt at time of IE include: ambulating w/ assistive device, inability to ambulate household distances, positive fall history and decreased initiation and engagement  Please find objective findings from PT assessment regarding body systems outlined above with impairments and limitations including weakness, decreased ROM, impaired balance, decreased endurance, gait deviations, decreased activity tolerance, decreased functional mobility tolerance, decreased safety awareness and orthopedic restrictions  The following objective measures performed on IE also reveal limitations: Barthel Index: 15/100  Pt's clinical presentation is currently unstable/unpredictable seen in pt's presentation of the deficits and impairments as noted above   Pt to benefit from continued PT tx to address deficits as defined above and maximize level of functional independent mobility and consistency  From PT/mobility standpoint, recommendation at time of d/c would be STR pending progress in order to facilitate return to PLOF  Recommendation: Short-term skilled PT          See flowsheet documentation for full assessment

## 2018-05-02 NOTE — PROGRESS NOTES
Cardiology Progress Note - Ronal Botello 74 y o  male MRN: [id number]    Unit/Bed#: [address] [place] [address] 204-02 Encounter: [id number]      Assessment:  Principal Problem:    Acute respiratory failure with hypoxia ([facility] )  Active Problems:    Parkinson disease ([facility] [address] )    Obesity due to excess calories    Hypertension    Diabetes mellitus with hypoglycemia (HCC)    COPD (chronic obstructive pulmonary disease) (HCC)    CHF (congestive heart failure) (HCC)    Permanent atrial fibrillation ([facility])    Hypomagnesemia    Hypokalemia    Anemia      Plan:    1  Acute on chronic diastolic CHF - Ceiba Chi is improving, but still is volume overloaded  We will increase Lasix to 80 mg twice daily for a few doses  Suggest higher dose of Lasix or torsemide by discharge  Continue to follow urine output, daily labs and weight  Echocardiogram shows normal LV systolic function  2   Permanent atrial fibrillation - Heart rates for the most part controlled  Continue all medical therapy as prescribed  On Coumadin for stroke prevention  INR therapeutic  Subjective:   Patient seen and examined  No significant events overnight  Patient states breathing is improved  Lying flat comfortably  Objective:     Vitals: Blood pressure 117/64, pulse (!) 106, temperature 97 5 °F (36 4 °C), temperature source Oral, resp  rate 20, weight 132 kg (291 lb 14 2 oz), SpO2 94 %  , There is no height or weight on file to calculate BMI , Orthostatic Blood Pressures      Most Recent Value   Blood Pressure  117/64 filed at 05/02/2018 1538   Patient Position - Orthostatic VS  Sitting filed at 05/02/2018 1538            Intake/Output Summary (Last 24 hours) at 05/02/18 1629  Last data filed at 05/02/18 1118   Gross per 24 hour   Intake              490 ml   Output             1725 ml   Net            -1235 ml       Telemetry review:  Atrial fibrillation      Physical Exam:    GEN: Ronal Botello appears well, alert and oriented x 3, pleasant and cooperative    Obese  HEENT: pupils equal, round, and reactive to light; extraocular muscles intact  NECK: supple, no carotid bruits  ++ JVD lying flat - obese  HEART: irregular rhythm, distant S1 and S2, no murmurs, clicks, gallops or rubs   LUNGS:  Diminished bilaterally; no wheezes, rales, or rhonchi   ABDOMEN: normal bowel sounds, soft, no tenderness, no distention  EXTREMITIES: peripheral pulses normal; no clubbing, cyanosis   + edema bilaterally  NEURO: no focal findings   SKIN: normal without suspicious lesions on exposed skin    Medications:      Current Facility-Administered Medications:     acetaminophen (TYLENOL) tablet 650 mg, 650 mg, Oral, Q4H PRN, ANTON Lieberman    atorvastatin (LIPITOR) tablet 40 mg, 40 mg, Oral, Daily With Dinner, ANTON Otero, 40 mg at 05/01/18 1806    buPROPion (WELLBUTRIN SR) 12 hr tablet 150 mg, 150 mg, Oral, BID, ANTON Hodges, 150 mg at 05/02/18 0853    carbidopa-levodopa (SINEMET)  mg per tablet 1 tablet, 1 tablet, Oral, Q6H, ANTON Hodges, 1 tablet at 05/02/18 1219    cholecalciferol (VITAMIN D3) tablet 2,000 Units, 2,000 Units, Oral, Daily, ANTON Lieberman, 2,000 Units at 05/02/18 3407    DULoxetine (CYMBALTA) delayed release capsule 20 mg, 20 mg, Oral, BID, ANTON Hodges, 20 mg at 05/02/18 8479    ezetimibe (ZETIA) tablet 10 mg, 10 mg, Oral, Daily, ANTON Hodges, 10 mg at 05/02/18 0852    fluticasone-salmeterol (ADVAIR) 250-50 mcg/dose inhaler 1 puff, 1 puff, Inhalation, Q12H [address], ANTON Hodges, 1 puff at 05/02/18 0952    furosemide (LASIX) injection 40 mg, 40 mg, Intravenous, BID (diuretic), ANTON Lieberman, 40 mg at 05/02/18 0852    insulin lispro (HumaLOG) 100 units/mL subcutaneous injection 1-5 Units, 1-5 Units, Subcutaneous, TID AC, 2 Units at 05/02/18 1216 **AND** Fingerstick Glucose (POCT), , , TID AC, ANTON Hodges    insulin lispro (HumaLOG) 100 units/mL subcutaneous injection 1-5 Units, 1-5 Units, Subcutaneous, HS, Basia Jordy, CRNP, 1 Units at 05/01/18 2151    levothyroxine tablet 50 mcg, 50 mcg, Oral, Early Morning, Charmaine Neville, CRNP, 50 mcg at 05/02/18 0608    magnesium oxide (MAG-OX) tablet 400 mg, 400 mg, Oral, Daily, Yelena Neville CRNP, 400 mg at 05/02/18 8397    metoprolol tartrate (LOPRESSOR) tablet 25 mg, 25 mg, Oral, BID, Charmaine Neville, CRNP, 25 mg at 05/02/18 6811    pantoprazole (PROTONIX) EC tablet 40 mg, 40 mg, Oral, Daily Before Breakfast, Charmaine Neville, CRNP, 40 mg at 05/02/18 0608    potassium chloride (K-DUR,KLOR-CON) CR tablet 20 mEq, 20 mEq, Oral, Once, Consuella Daniela, DO    potassium chloride (K-DUR,KLOR-CON) CR tablet 20 mEq, 20 mEq, Oral, BID, Charmaine Neville CRNP, 20 mEq at 05/02/18 7143    tiotropium (SPIRIVA) capsule for inhaler 18 mcg, 18 mcg, Inhalation, Daily, Basia Jordy, CRNP, 18 mcg at 05/02/18 6883    warfarin (COUMADIN) tablet 2 5 mg, 2 5 mg, Oral, Daily, Yelena Neville, CRNP, 2 5 mg at 05/01/18 1806     Labs & Results:      Results from last 7 days  Lab Units 04/30/18  0503 04/30/18  0132 04/29/18  2129   TROPONIN I ng/mL <0 02 <0 02 <0 02       Results from last 7 days  Lab Units 05/02/18  0530 04/30/18  0504 04/29/18  2129   WBC Thousand/uL 7 99 7 62 8 64   HEMOGLOBIN g/dL 9 1* 9 5* 10 1*   HEMATOCRIT % 30 8* 32 1* 33 8*   PLATELETS Thousands/uL 244 222 236       Results from last 7 days  Lab Units 04/30/18  0503   CHOLESTEROL mg/dL 61   TRIGLYCERIDES mg/dL 39   HDL mg/dL 33*       Results from last 7 days  Lab Units 05/02/18  0530 05/01/18  1202 04/30/18  1714 04/30/18  0503 04/29/18  2129   SODIUM mmol/L 142  --  145 145 146*   POTASSIUM mmol/L 3 3* 3 8 3 4* 2 7* 2 9*   CHLORIDE mmol/L 103  --  105 104 104   CO2 mmol/L 33*  --  35* 34* 36*   BUN mg/dL 20  --  18 16 19   CREATININE mg/dL 1 08  --  1 04 1 02 1 14   CALCIUM mg/dL 8 2*  --  8 1* 7 5* 7 7*   TOTAL PROTEIN g/dL  --   --   --   --  6 5   BILIRUBIN TOTAL mg/dL  --   --   -- --  1 20*   ALK PHOS U/L  --   --   --   --  119*   ALT U/L  --   --   --   --  8*   AST U/L  --   --   --   --  16   GLUCOSE RANDOM mg/dL 143*  --  88 79 52*       Results from last 7 days  Lab Units 05/02/18  0530 04/30/18  0503 04/29/18 2129   INR  2 54* 2 44* 2 38*   PTT seconds  --   --  46*       Results from last 7 days  Lab Units 04/30/18  0503 04/29/18  2129   MAGNESIUM mg/dL 1 6 1 3*       EKG personally reviewed by Maykel Motta MD   Atrial fibrillation, PVCs, nonspecific ST and T-wave changes  ECHO:  LEFT VENTRICLE:  Systolic function was normal  Ejection fraction was estimated to be 60 %  Although no diagnostic regional wall motion abnormality was identified, this possibility cannot be completely excluded on the basis of this study  Wall thickness was mildly to moderately increased      LEFT ATRIUM:  The atrium was mildly dilated      RIGHT ATRIUM:  The atrium was mildly dilated      MITRAL VALVE:  There was mild annular calcification  There was trace regurgitation      TRICUSPID VALVE:  There was mild regurgitation  Pulmonary artery systolic pressure was within the normal range        Counseling / Coordination of Care  Total floor / unit time spent today 25 minutes  Greater than 50% of total time was spent with the patient and / or family counseling and / or coordination of care

## 2018-05-02 NOTE — PLAN OF CARE
Problem: OCCUPATIONAL THERAPY ADULT  Goal: Performs self-care activities at highest level of function for planned discharge setting  See evaluation for individualized goals  Treatment Interventions: ADL retraining, Functional transfer training, UE strengthening/ROM, Endurance training, Equipment evaluation/education, Cognitive reorientation, Patient/family training          See flowsheet documentation for full assessment, interventions and recommendations  Limitation: Decreased ADL status, Decreased UE strength, Decreased UE ROM, Decreased cognition, Decreased Safe judgement during ADL, Decreased endurance, Decreased self-care trans, Decreased fine motor control  Prognosis: Fair  Assessment: Patient is a 73 y/o male with hx of Parkinsons Diseaes, Obesity and COPD [place] [facility] [place]/o SOB, also found to have a low blood sugar of 50  Patient diagnosed with Acute Respiratory Failure due to Acute CHF exacerbation  Per CM, patient has been [facility] with eventual long-term goal of discharge home with wife and family support  Patient is alert and oriened to person only with inconsistent orientation to place and time  Patient is ? historian, however reports he would eventually like to return home with family  Patient presents with decreased activity tolerance, ridigity in UEs and trunk, decreased sitting balance, postura and trunk control as well as overal muscle weakness impacting occupational performance in areas of self-feeding, grooming, bathing, dressing, ADL transfers, toileting  From OT standpoint recommend return to SNF/Rehab post discharge  Will continue to follow for acute care OT services to progress basic ADL skills to highest level of indpendence and safety with least amount of caregiver support  Recommendation: PT consult  OT Discharge Recommendation: Short Term Rehab  OT - OK to Discharge:  Yes

## 2018-05-02 NOTE — OCCUPATIONAL THERAPY NOTE
Occupational Therapy Evaluation      Nika Kohli    5/2/2018    Patient Active Problem List   Diagnosis    Parkinson disease ([facility] )    Obesity due to excess calories    Hypertension    [facility]    COPD [facility]ronic obstructive [facility]) [facility]    CHF (congestive heart failure) (HCC)    Permanent atrial fibrillation (HCC)    Acute respiratory failure with hypoxia (HCC)    Hypomagnesemia    Hypokalemia    Anemia       Past Medical History:   Diagnosis Date    Atrial fibrillation (HCC)     BPH (benign prostatic hyperplasia)     CAD (coronary artery disease)     CHF (congestive heart failure) (HCC)     COPD (chronic obstructive pulmonary disease) ([facility]     Diabetes mellitus ([facility]     Disease of thyroid gland     HYPO    Hyperlipidemia     Hypertension     Metabolic encephalopathy     Obese     Parkinson disease ([facility]        No past surgical history on file  05/02/18 6646   Note Type   Note type Eval only   Pain Assessment   Pain Assessment No/denies pain   Home Living   Type of Home SNF  (has been in STR at SNF for last few weeks)   Additional Comments Patient currently at [facility] for rehab, however plan is for eventual discharge home with family support  Resides in multi-level home with steps to enter    Prior Function   Lives With Kamini Help From Family   ADL Assistance Needs assistance   IADLs Needs assistance   Vocational Retired   Lifestyle   Autonomy Patient reports he is currently at [facility] for rehab, is able to feed self, requires A x 1 with ADLs and A x 1-2 with mobility   Reports he uses w/c as primary mode of mobility, is able to ambulate x 20 feet with assitance and was getting therapy services    Reciprocal Relationships Patient has supportive wife and family involved with care   Service to Others Retired     Psychosocial   Psychosocial (WDL) X   Patient Behaviors/Mood Cooperative;Flat affect Subjective   Subjective "they always want to try to help me stand but I don't need help"   ADL   Eating Assistance 5  Supervision/Setup   Grooming Assistance 4  Minimal Assistance   UB Bathing Assistance 2  Maximal Assistance   LB Bathing Assistance 2  Maximal Assistance   UB Dressing Assistance 2  Maximal Assistance   LB Dressing Assistance 2  Maximal [address]  2  Maximal Assistance   Functional Assistance 2  Maximal Assistance   Bed Mobility   Supine to Sit 3  Moderate assistance   Additional items Assist x 2   Sit to Supine 3  Moderate assistance   Additional items Assist x 2   Transfers   Sit to Stand Unable to assess  (unable to come to full stand with Max A x 1)   Balance   Static Sitting Fair   Dynamic Sitting Fair -   Static Standing Poor +   Dynamic Standing Poor -   Activity Tolerance   Activity Tolerance Patient limited by fatigue  (Activity Tolerance: Fair- + mild SOB )   Medical Staff Made Aware OT spoke with [facility] [place] [facility], PT and JULIANA Bryant   RUE Assessment   RUE Assessment WFL   LUE Assessment   LUE Assessment X   Edema   LUE Edema (limited AROM L shoulder flexion/Abd )   Hand Function   Gross Motor Coordination (delayed )   Fine Motor Coordination (delayed )   Cognition   Overall Cognitive Status Impaired   Arousal/Participation Alert; Responsive   Attention Within functional limits  (slow to process and respond )   Orientation Level Oriented to person   Memory Decreased recall of precautions;Decreased recall of recent events;Decreased short term memory   Following Commands Follows multistep commands inconsistently   Assessment   Limitation Decreased ADL status; Decreased UE strength;Decreased UE ROM; Decreased cognition;Decreased Safe judgement during ADL;Decreased endurance;Decreased self-care trans;Decreased fine motor control   Prognosis Fair   Assessment Patient is a 73 y/o male with hx of Parkinsons Diseaes, Obesity and COPD admitted from [facility] with c/o SOB, also found to have a low blood sugar of 50  Patient diagnosed with Acute Respiratory Failure due to Acute CHF exacerbation  Per CM, patient has been at [facility] for rehab with eventual long-term goal of discharge home with wife and family support  Patient is alert and oriened to person only with inconsistent orientation to place and time  Patient is ? historian, however reports he would eventually like to return home with family  Patient presents with decreased activity tolerance, ridigity in UEs and trunk, decreased sitting balance, postura and trunk control as well as overal muscle weakness impacting occupational performance in areas of self-feeding, grooming, bathing, dressing, ADL transfers, toileting  From OT standpoint recommend return to SNF/Rehab post discharge  Will continue to follow for acute care OT services to progress basic ADL skills to highest level of indpendence and safety with least amount of caregiver support  Goals   Patient Goals " I want to go home"   LTG Time Frame 3-7   Long Term Goal #1 1  Patient will complete self-feeding seated upright in chair with set-up ; 2   Patient will increase simple grooming skills to SBA level 3  Patient will increase UB bathing and dressing to Min A x 1; 4  Patient will increase ADL transfers to Min A x 1 for use of toileiting on BS commode; 5  Patient will increase bed mobility to Min A x 2    Plan   Treatment Interventions ADL retraining;Functional transfer training;UE strengthening/ROM; Endurance training;Equipment evaluation/education;Cognitive reorientation;Patient/family training   Goal Expiration Date 05/09/18   OT Frequency 2-3x/wk   Recommendation   Recommendation PT consult   OT Discharge Recommendation Short Term Rehab   OT - OK to Discharge Yes   Barthel Index   Feeding 5   Bathing 0   Grooming Score 0   Dressing Score 0   Bladder Score 0   Bowels Score 0   Toilet Use Score 0   Transfers (Bed/Chair) Score 5   Mobility (Level Surface) Score 0   Stairs Score 0   Barthel Index Score 10   Modified [place] Scale   Modified [place] Scale 4   Estrada Caceres, OT

## 2018-05-02 NOTE — ASSESSMENT & PLAN NOTE
-due to acute congestive heart failure exacerbation  -Echocardiogram read pending  -continue IV Lasix  -appreciate Cardiology

## 2018-05-02 NOTE — PHYSICAL THERAPY NOTE
Physical Therapy Evaluation      Patient Active Problem List   Diagnosis    Parkinson disease ([place] [address] )    Obesity due to excess calories    Hypertension    Diabetes mellitus with hypoglycemia ([place] [address] )    COPD (chronic obstructive pulmonary disease) (HCC)    CHF (congestive heart failure) (HCC)    Permanent atrial fibrillation (HCC)    Acute respiratory failure with hypoxia (HCC)    Hypomagnesemia    Hypokalemia    Anemia       Past Medical History:   Diagnosis Date    Atrial fibrillation (HCC)     BPH (benign prostatic hyperplasia)     CAD (coronary artery disease)     CHF (congestive heart failure) (HCC)     COPD (chronic obstructive pulmonary disease) ([place] [address] )     Diabetes mellitus ([place] [address] )     Disease of thyroid gland     HYPO    Hyperlipidemia     Hypertension     Metabolic encephalopathy     Obese     Parkinson disease ([place] [address] )         05/02/18 1335   Note Type   Note type Eval only   Pain Assessment   Pain Assessment No/denies pain   Pain Score No Pain   Home Living   Type of Home SNF  (reports he has not been home since Jan)   Additional Comments Pt is currently at [facility] for SNF however states eventual D/C is to return home with family   Prior Function   Level of [place] Independent with ADLs and functional mobility   Lives With Spouse   Receives Help From Family   ADL Assistance Needs assistance   IADLs Needs assistance   Vocational Retired   Restrictions/Precautions   Okarche Somin Bearing Precautions Per Order No   Other Precautions Contact/isolation;Cognitive; Fall Risk   General   Additional Pertinent History RN cleared patient for therapy session, pt received supine in bed agreeable to therapy session   Family/Caregiver Present No   Cognition   Overall Cognitive Status Impaired   Arousal/Participation Alert   Attention Within functional limits  (slow to process/respond)   Orientation Level Oriented to person   Memory Decreased recall of recent events   Following Commands Follows multistep commands inconsistently   RLE Assessment   RLE Assessment WFL   LLE Assessment   LLE Assessment WFL   Bed Mobility   Supine to Sit 3  Moderate assistance   Additional items Assist x 2   Sit to Supine 3  Moderate assistance   Additional items Assist x 2;Verbal cues;LE management   Transfers   Sit to Stand 2  Maximal assistance   Additional items Assist x 2;Bedrails; Increased time required;Verbal cues  (unable to achieve full upright stand)   Stand to Sit 2  Maximal assistance   Additional items Assist x 2;Verbal cues   Additional Comments performed sit scoot to [address]   Ambulation/Elevation   Gait pattern Not tested  (pt demonstrated B/L knee buckling with stand attempt)   Balance   Static Sitting Fair   Dynamic Sitting Fair -   Static Standing Poor -   Endurance Deficit   Endurance Deficit Yes   Endurance Deficit Description generalized weakness/deconditioning   Activity Tolerance   Activity Tolerance Patient limited by fatigue   Medical Staff Made Aware OT   Nurse Made Aware yes   Assessment   Prognosis Fair   Problem List Decreased strength;Decreased endurance; Impaired balance;Decreased mobility; Decreased cognition   Assessment Pt is 74 y o  male seen for PT evaluation s/p admit to [facility] on 4/29/2018 w/ Acute respiratory failure with hypoxia ([facility] )  PT consulted to assess pt's functional mobility and d/c needs  Order placed for PT eval and tx, w/ up w/ A order  Comorbidities affecting pt's physical performance at time of assessment include those as noted above  PTA, pt was at a SNF for therapy; prior to rehab pt was living at home with family  Personal factors affecting pt at time of IE include: ambulating w/ assistive device, inability to ambulate household distances, positive fall history and decreased initiation and engagement   Please find objective findings from PT assessment regarding body systems outlined above with impairments and limitations including weakness, decreased ROM, impaired balance, decreased endurance, gait deviations, decreased activity tolerance, decreased functional mobility tolerance, decreased safety awareness and orthopedic restrictions  The following objective measures performed on IE also reveal limitations: Barthel Index: 15/100  Pt's clinical presentation is currently unstable/unpredictable seen in pt's presentation of the deficits and impairments as noted above  Pt to benefit from continued PT tx to address deficits as defined above and maximize level of functional independent mobility and consistency  From PT/mobility standpoint, recommendation at time of d/c would be STR pending progress in order to facilitate return to PLOF  Goals   Patient Goals I want to go home   STG Expiration Date 05/12/18   Short Term Goal #1 In 7-10 days pt will perform bed mobility with min A, pt will transfer with min A, pt will amb 10ft with RW and min A x2   Plan   Treatment/Interventions Functional transfer training;LE strengthening/ROM; Therapeutic exercise; Endurance training;Patient/family training;Equipment eval/education; Bed mobility;Gait training;Continued evaluation;Spoke to nursing;Spoke to case management;OT   PT Frequency 5x/wk   Recommendation   Recommendation Short-term skilled PT   Equipment Recommended Walker   Barthel Index   Feeding 5   Bathing 0   Grooming Score 0   Dressing Score 0   Bladder Score 0   Bowels Score 5   Toilet Use Score 0   Transfers (Bed/Chair) Score 5   Mobility (Level Surface) Score 0   Stairs Score 0   Barthel Index Score 15           Jess Trevizo, PT

## 2018-05-02 NOTE — PROGRESS NOTES
Pt observed to be sleeping for only few short intervals during hrly rounds overnight between q2hr repositioning; denies pain/dyspnea  Req bedpan x2; no stool; only flatus

## 2018-05-02 NOTE — PROGRESS NOTES
Progress Note Yolande Shirley 1945, 74 y o  male MRN: [id number]    Unit/Bed#: [address] [place], [place] [id number] Encounter: [id number]    Primary Care Provider: Tosha Molina MD   Date and time admitted to hospital: 4/29/2018  8:34 PM        Anemia   Assessment & Plan    -iron panel unremarkable  -suspect anemia of chronic disease  -hemoglobin stable        Hypokalemia   Assessment & Plan    -40 mEq of Kdur now        Hypomagnesemia   Assessment & Plan    -continue oral magnesium        Permanent atrial fibrillation (HCC)   Assessment & Plan    -continue Coumadin, INR therapeutic  -continue beta-blocker        CHF (congestive heart failure) ([facility])   Assessment & Plan    -see above  -echo pending        COPD [facility]chronic obstructive [facility] disease) [facility] )   Assessment & Plan    -continue Spiriva and Advair        Diabetes mellitus with hypoglycemia ([facility])   Assessment & Plan    -continue sliding scale insulin if needed        Hypertension   Assessment & Plan    -continue beta-blocker        Obesity due to excess calories   Assessment & Plan    -encourage weight loss        Parkinson disease ([facility] )   Assessment & Plan    -continue Sinemet        * Acute respiratory failure with hypoxia ([facility])   Assessment & Plan    -due to acute congestive heart failure exacerbation  -Echocardiogram read pending  -continue IV Lasix  -appreciate Cardiology          VTE Pharmacologic Prophylaxis:   Pharmacologic: Warfarin (Coumadin)  Mechanical VTE Prophylaxis in Place: No    Patient Centered Rounds: I have performed bedside rounds with nursing staff today  Education and Discussions with Family / Patient: Pt  Pt's daughter updated over the phone  Time Spent for Care: 20 minutes  More than 50% of total time spent on counseling and coordination of care as described above      Current Length of Stay: 3 day(s)    Current Patient Status: Inpatient   Certification Statement: The patient will continue to require additional inpatient hospital stay due to CHF exac    Discharge Plan:  Hopefully tomorrow    Code Status: Level 1 - Full Code      Subjective:   No new complaints  Objective:     Vitals:   Temp (24hrs), Av 9 °F (36 6 °C), Min:97 4 °F (36 3 °C), Max:98 3 °F (36 8 °C)    HR:  [] 79  Resp:  [18-20] 18  BP: (120-139)/(60-85) 129/85  SpO2:  [92 %-97 %] 93 %  There is no height or weight on file to calculate BMI  Input and Output Summary (last 24 hours):        Intake/Output Summary (Last 24 hours) at 18 0954  Last data filed at 18 0522   Gross per 24 hour   Intake              480 ml   Output             2625 ml   Net            -2145 ml       Physical Exam:     Physical Exam  Gen: NAD, awake alert and oriented times 2-3, well developed, well nourished  Eyes: EOMI, PERRLA, no scleral icterus  ENMT:  Oropharynx clear of erythema or exudates, no nasal discharge, no otic discharge, moist mucous membranes  Neck:  Supple  Cardiovascular:  Normal rate, Irregularly irregular rhythm, normal rate, normal S1-S2, no murmurs, rubs, or gallops  Lungs:   clear to auscultation bilaterally, no wheezes, or rales, or rhonchi  Abdomen:  Positive bowel sounds, soft, nontender, nondistended, no palpable organomegaly   Skin:  Intact, no obvious lesions or rashes, trace lower extremity edema  Neuro: Cranial nerves 2-12 are intact, non-focal    Additional Data:     Labs:      Results from last 7 days  Lab Units 18  0530   WBC Thousand/uL 7 99   HEMOGLOBIN g/dL 9 1*   HEMATOCRIT % 30 8*   PLATELETS Thousands/uL 244   NEUTROS PCT % 73   LYMPHS PCT % 13*   MONOS PCT % 11   EOS PCT % 3       Results from last 7 days  Lab Units 18  0530  18  2129   SODIUM mmol/L 142  < > 146*   POTASSIUM mmol/L 3 3*  < > 2 9*   CHLORIDE mmol/L 103  < > 104   CO2 mmol/L 33*  < > 36*   BUN mg/dL 20  < > 19   CREATININE mg/dL 1 08  < > 1 14   CALCIUM mg/dL 8 2*  < > 7 7*   TOTAL PROTEIN g/dL  --   --  6 5   BILIRUBIN TOTAL mg/dL  --   --  1 20*   ALK PHOS U/L  -- --  119*   ALT U/L  --   --  8*   AST U/L  --   --  16   GLUCOSE RANDOM mg/dL 143*  < > 52*   < > = values in this interval not displayed  Results from last 7 days  Lab Units 05/02/18  0530   INR  2 54*       Results from last 7 days  Lab Units 05/02/18  0617 05/01/18  2051 05/01/18  1621 05/01/18  1114 05/01/18  0610 04/30/18  2140 04/30/18  1647 04/30/18  1153 04/30/18  3767 04/30/18  0148 04/29/18  2147 04/29/18  2045   POC GLUCOSE mg/dl 141* 191* 166* 192* 111 121 89 88 76 74 180* 48*             * I Have Reviewed All Lab Data Listed Above  * Additional Pertinent Lab Tests Reviewed:  [facility] Ad[facility] Reviewed      Recent Cultures (last 7 days):           Last 24 Hours Medication List:     Current Facility-Administered Medications:  acetaminophen 650 mg Oral Q4H PRN ANTON Delcid   atorvastatin 40 mg Oral Daily With [employer], ANTON   buPROPion 150 mg Oral BID ANTON Delcid   carbidopa-levodopa 1 tablet Oral Q6H ANTON Delcid   cholecalciferol 2,000 Units Oral Daily ANTON Hodges   DULoxetine 20 mg Oral BID ANTON Delcid   ezetimibe 10 mg Oral Daily ANTON Delcid   fluticasone-salmeterol 1 puff Inhalation Q12H [address] ANTON Hodges   furosemide 40 mg Intravenous BID (diuretic) ANTON Delcid   insulin lispro 1-5 Units Subcutaneous TID AC ANTON Hodges   insulin lispro 1-5 Units Subcutaneous HS ANTON Hodges   levothyroxine 50 mcg Oral Early Morning ANTON Hodges   magnesium oxide 400 mg Oral Daily ANTON Hodges   metoprolol tartrate 25 mg Oral BID ANTON Hodges   pantoprazole 40 mg Oral Daily Before Breakfast ANTON Hodges   potassium chloride 20 mEq Oral Once Juani Eis, DO   potassium chloride 20 mEq Oral BID Beth Gunning [place], CRNP   potassium chloride 40 mEq Oral Once Basia Life, MD   tiotropium 18 mcg Inhalation Daily ANTON Delcid warfarin 2 5 mg Oral Daily ANTON Guerra        Today, Patient Was Seen By: India Narayan MD    ** Please Note: Dictation voice to text software may have been used in the creation of this document   **

## 2018-05-02 NOTE — SOCIAL WORK
Continue to follow  [facility] Pt once medically stable  Spoke with Pt's dtr(Kayy: [phone], work # [phone]), made aware [facility] can accept Pt once medically stable  Call made to Pt's insurance([place]: Kailee Rivera), pending reference number for [facility], # [id number]  Faxed requested information to Rosa Maria at [facility] to obtain skilled auth  Await determination  Will follow

## 2018-05-03 PROBLEM — E87.6 HYPOKALEMIA: Status: RESOLVED | Noted: 2018-04-30 | Resolved: 2018-05-03

## 2018-05-03 PROBLEM — J96.01 ACUTE RESPIRATORY FAILURE WITH HYPOXIA (HCC): Status: RESOLVED | Noted: 2018-04-30 | Resolved: 2018-05-03

## 2018-05-03 LAB
ANION GAP SERPL CALCULATED.3IONS-SCNC: 6 MMOL/L (ref 4–13)
BASOPHILS # BLD AUTO: 0.01 THOUSANDS/ΜL (ref 0–0.1)
BASOPHILS NFR BLD AUTO: 0 % (ref 0–1)
BUN SERPL-MCNC: 20 MG/DL (ref 5–25)
CALCIUM SERPL-MCNC: 8.4 MG/DL (ref 8.3–10.1)
CHLORIDE SERPL-SCNC: 103 MMOL/L (ref 100–108)
CO2 SERPL-SCNC: 32 MMOL/L (ref 21–32)
CREAT SERPL-MCNC: 1.07 MG/DL (ref 0.6–1.3)
EOSINOPHIL # BLD AUTO: 0.25 THOUSAND/ΜL (ref 0–0.61)
EOSINOPHIL NFR BLD AUTO: 3 % (ref 0–6)
ERYTHROCYTE [DISTWIDTH] IN BLOOD BY AUTOMATED COUNT: 15.2 % (ref 11.6–15.1)
GFR SERPL CREATININE-BSD FRML MDRD: 68 ML/MIN/1.73SQ M
GLUCOSE SERPL-MCNC: 149 MG/DL (ref 65–140)
GLUCOSE SERPL-MCNC: 157 MG/DL (ref 65–140)
GLUCOSE SERPL-MCNC: 165 MG/DL (ref 65–140)
GLUCOSE SERPL-MCNC: 198 MG/DL (ref 65–140)
GLUCOSE SERPL-MCNC: 222 MG/DL (ref 65–140)
HCT VFR BLD AUTO: 32 % (ref 36.5–49.3)
HGB BLD-MCNC: 9.4 G/DL (ref 12–17)
LYMPHOCYTES # BLD AUTO: 1.24 THOUSANDS/ΜL (ref 0.6–4.47)
LYMPHOCYTES NFR BLD AUTO: 15 % (ref 14–44)
MCH RBC QN AUTO: 28.6 PG (ref 26.8–34.3)
MCHC RBC AUTO-ENTMCNC: 29.4 G/DL (ref 31.4–37.4)
MCV RBC AUTO: 97 FL (ref 82–98)
MONOCYTES # BLD AUTO: 0.79 THOUSAND/ΜL (ref 0.17–1.22)
MONOCYTES NFR BLD AUTO: 9 % (ref 4–12)
NEUTROPHILS # BLD AUTO: 6.23 THOUSANDS/ΜL (ref 1.85–7.62)
NEUTS SEG NFR BLD AUTO: 73 % (ref 43–75)
PLATELET # BLD AUTO: 274 THOUSANDS/UL (ref 149–390)
PMV BLD AUTO: 11.1 FL (ref 8.9–12.7)
POTASSIUM SERPL-SCNC: 4.1 MMOL/L (ref 3.5–5.3)
RBC # BLD AUTO: 3.29 MILLION/UL (ref 3.88–5.62)
SODIUM SERPL-SCNC: 141 MMOL/L (ref 136–145)
WBC # BLD AUTO: 8.52 THOUSAND/UL (ref 4.31–10.16)

## 2018-05-03 PROCEDURE — 94640 AIRWAY INHALATION TREATMENT: CPT

## 2018-05-03 PROCEDURE — 85025 COMPLETE CBC W/AUTO DIFF WBC: CPT | Performed by: HOSPITALIST

## 2018-05-03 PROCEDURE — 94760 N-INVAS EAR/PLS OXIMETRY 1: CPT

## 2018-05-03 PROCEDURE — 99232 SBSQ HOSP IP/OBS MODERATE 35: CPT | Performed by: INTERNAL MEDICINE

## 2018-05-03 PROCEDURE — 82948 REAGENT STRIP/BLOOD GLUCOSE: CPT

## 2018-05-03 PROCEDURE — 80048 BASIC METABOLIC PNL TOTAL CA: CPT | Performed by: HOSPITALIST

## 2018-05-03 PROCEDURE — 99232 SBSQ HOSP IP/OBS MODERATE 35: CPT | Performed by: HOSPITALIST

## 2018-05-03 RX ORDER — INSULIN GLARGINE 100 [IU]/ML
8 INJECTION, SOLUTION SUBCUTANEOUS
Status: DISCONTINUED | OUTPATIENT
Start: 2018-05-03 | End: 2018-05-04 | Stop reason: HOSPADM

## 2018-05-03 RX ADMIN — FUROSEMIDE 80 MG: 10 INJECTION, SOLUTION INTRAVENOUS at 08:37

## 2018-05-03 RX ADMIN — CARBIDOPA AND LEVODOPA 1 TABLET: 25; 100 TABLET ORAL at 21:17

## 2018-05-03 RX ADMIN — BUPROPION HYDROCHLORIDE 150 MG: 150 TABLET, EXTENDED RELEASE ORAL at 17:12

## 2018-05-03 RX ADMIN — INSULIN LISPRO 1 UNITS: 100 INJECTION, SOLUTION INTRAVENOUS; SUBCUTANEOUS at 08:39

## 2018-05-03 RX ADMIN — POTASSIUM CHLORIDE 20 MEQ: 1500 TABLET, EXTENDED RELEASE ORAL at 17:11

## 2018-05-03 RX ADMIN — DULOXETINE HYDROCHLORIDE 20 MG: 20 CAPSULE, DELAYED RELEASE ORAL at 08:41

## 2018-05-03 RX ADMIN — FUROSEMIDE 80 MG: 10 INJECTION, SOLUTION INTRAVENOUS at 17:07

## 2018-05-03 RX ADMIN — PANTOPRAZOLE SODIUM 40 MG: 40 TABLET, DELAYED RELEASE ORAL at 06:06

## 2018-05-03 RX ADMIN — CARBIDOPA AND LEVODOPA 1 TABLET: 25; 100 TABLET ORAL at 13:40

## 2018-05-03 RX ADMIN — WARFARIN SODIUM 2.5 MG: 2.5 TABLET ORAL at 17:11

## 2018-05-03 RX ADMIN — CARBIDOPA AND LEVODOPA 1 TABLET: 25; 100 TABLET ORAL at 00:35

## 2018-05-03 RX ADMIN — POTASSIUM CHLORIDE 20 MEQ: 1500 TABLET, EXTENDED RELEASE ORAL at 08:40

## 2018-05-03 RX ADMIN — CARBIDOPA AND LEVODOPA 1 TABLET: 25; 100 TABLET ORAL at 06:06

## 2018-05-03 RX ADMIN — VITAMIN D, TAB 1000IU (100/BT) 2000 UNITS: 25 TAB at 08:40

## 2018-05-03 RX ADMIN — DULOXETINE HYDROCHLORIDE 20 MG: 20 CAPSULE, DELAYED RELEASE ORAL at 17:12

## 2018-05-03 RX ADMIN — INSULIN LISPRO 1 UNITS: 100 INJECTION, SOLUTION INTRAVENOUS; SUBCUTANEOUS at 17:13

## 2018-05-03 RX ADMIN — METOPROLOL TARTRATE 25 MG: 25 TABLET ORAL at 08:40

## 2018-05-03 RX ADMIN — EZETIMIBE 10 MG: 10 TABLET ORAL at 08:40

## 2018-05-03 RX ADMIN — FLUTICASONE PROPIONATE AND SALMETEROL 1 PUFF: 50; 250 POWDER RESPIRATORY (INHALATION) at 10:02

## 2018-05-03 RX ADMIN — INSULIN LISPRO 1 UNITS: 100 INJECTION, SOLUTION INTRAVENOUS; SUBCUTANEOUS at 21:18

## 2018-05-03 RX ADMIN — LEVOTHYROXINE SODIUM 50 MCG: 25 TABLET ORAL at 05:08

## 2018-05-03 RX ADMIN — Medication 400 MG: at 08:40

## 2018-05-03 RX ADMIN — BUPROPION HYDROCHLORIDE 150 MG: 150 TABLET, EXTENDED RELEASE ORAL at 08:41

## 2018-05-03 RX ADMIN — FLUTICASONE PROPIONATE AND SALMETEROL 1 PUFF: 50; 250 POWDER RESPIRATORY (INHALATION) at 20:44

## 2018-05-03 RX ADMIN — ATORVASTATIN CALCIUM 40 MG: 40 TABLET, FILM COATED ORAL at 17:11

## 2018-05-03 RX ADMIN — INSULIN GLARGINE 8 UNITS: 100 INJECTION, SOLUTION SUBCUTANEOUS at 21:17

## 2018-05-03 RX ADMIN — INSULIN LISPRO 1 UNITS: 100 INJECTION, SOLUTION INTRAVENOUS; SUBCUTANEOUS at 13:40

## 2018-05-03 RX ADMIN — TIOTROPIUM BROMIDE 18 MCG: 18 CAPSULE ORAL; RESPIRATORY (INHALATION) at 10:02

## 2018-05-03 RX ADMIN — METOPROLOL TARTRATE 25 MG: 25 TABLET ORAL at 17:11

## 2018-05-03 NOTE — ASSESSMENT & PLAN NOTE
-on admission the patient had acute hypoxemic respiratory failure due to due to acute on chronic diastolic congestive heart failure exacerbation  This has resolved    -Echo:  EF 60%  -continue IV Lasix at increased dose (increased 80 mg IV q 12 yesterday)  -creatinine is stable on increased Lasix dose  -appreciate Cardiology

## 2018-05-03 NOTE — PROGRESS NOTES
Progress Note Kenroy Arreaga 1945, 74 y o  male MRN: [id number]    Unit/Bed#: [address] [place] [address] Encounter: [id number]    Primary Care Provider: Sade Da Silva MD   Date and time admitted to hospital: 4/29/2018  8:34 PM        Anemia   Assessment & Plan    -iron panel unremarkable  -suspect anemia of chronic disease  -hemoglobin stable        Hypomagnesemia   Assessment & Plan    -continue oral magnesium        Permanent atrial fibrillation (HCC)   Assessment & Plan    -continue Coumadin, INR therapeutic  -continue beta-blocker        COPD (chronic obstructive pulmonary disease) ([facility])   Assessment & Plan    -continue Spiriva and Advair        Diabetes mellitus with hypoglycemia ([facility])   Assessment & Plan    -start Lantus 8 units at bedtime  -continue sliding scale insulin        Hypertension   Assessment & Plan    -continue beta-blocker        Obesity due to excess calories   Assessment & Plan    -encourage weight loss        Parkinson disease ([facility])   Assessment & Plan    -continue Sinemet        * Acute on chronic diastolic congestive heart failure ([facility])   Assessment & Plan    -on admission the patient had acute hypoxemic respiratory failure due to due to acute on chronic diastolic congestive heart failure exacerbation  This has resolved  -Echo:  EF 60%  -continue IV Lasix at increased dose (increased 80 mg IV q 12 yesterday)  -creatinine is stable on increased Lasix dose  -appreciate Cardiology          VTE Pharmacologic Prophylaxis:   Pharmacologic: Warfarin (Coumadin)  Mechanical VTE Prophylaxis in Place: Yes    Patient Centered Rounds: I have performed bedside rounds with nursing staff today  Education and Discussions with Family / Patient:  Patient    Time Spent for Care: 20 minutes  More than 50% of total time spent on counseling and coordination of care as described above      Current Length of Stay: 4 day(s)    Current Patient Status: Inpatient   Certification Statement: The patient will continue to require additional inpatient hospital stay due to CHF exacerbation    Discharge Plan:  1-2 days    Code Status: Level 1 - Full Code      Subjective:   Denies shortness of breath  Objective:     Vitals:   Temp (24hrs), Av 8 °F (36 6 °C), Min:97 5 °F (36 4 °C), Max:98 2 °F (36 8 °C)    HR:  [106-116] 116  Resp:  [20] 20  BP: (112-135)/(51-71) 112/51  SpO2:  [91 %-95 %] 95 %  There is no height or weight on file to calculate BMI  Input and Output Summary (last 24 hours):        Intake/Output Summary (Last 24 hours) at 18 1054  Last data filed at 18 1005   Gross per 24 hour   Intake                0 ml   Output             1750 ml   Net            -1750 ml       Physical Exam:     Physical Exam  Gen: NAD, awake alert and oriented times 2-3, well developed, well nourished  Eyes: EOMI, PERRLA, no scleral icterus  ENMT:  Oropharynx clear of erythema or exudates, no nasal discharge, no otic discharge, moist mucous membranes  Neck:  Supple  Cardiovascular:  Normal rate, Irregularly irregular rhythm, normal rate, normal S1-S2, no murmurs, rubs, or gallops  Lungs:   clear to auscultation bilaterally, no wheezes, or rales, or rhonchi  Abdomen:  Positive bowel sounds, soft, nontender, nondistended, no palpable organomegaly   Skin:  Intact, no obvious lesions or rashes, no lower extremity edema  Neuro: Cranial nerves 2-12 are intact, non-focal    Additional Data:     Labs:      Results from last 7 days  Lab Units 18  0506   WBC Thousand/uL 8 52   HEMOGLOBIN g/dL 9 4*   HEMATOCRIT % 32 0*   PLATELETS Thousands/uL 274   NEUTROS PCT % 73   LYMPHS PCT % 15   MONOS PCT % 9   EOS PCT % 3       Results from last 7 days  Lab Units 18  0506  18  2129   SODIUM mmol/L 141  < > 146*   POTASSIUM mmol/L 4 1  < > 2 9*   CHLORIDE mmol/L 103  < > 104   CO2 mmol/L 32  < > 36*   BUN mg/dL 20  < > 19   CREATININE mg/dL 1 07  < > 1 14   CALCIUM mg/dL 8 4  < > 7 7*   TOTAL PROTEIN g/dL  --   --  6 5 BILIRUBIN TOTAL mg/dL  --   --  1 20*   ALK PHOS U/L  --   --  119*   ALT U/L  --   --  8*   AST U/L  --   --  16   GLUCOSE RANDOM mg/dL 149*  < > 52*   < > = values in this interval not displayed  Results from last 7 days  Lab Units 05/02/18  0530   INR  2 54*       Results from last 7 days  Lab Units 05/03/18  0642 05/02/18  2119 05/02/18  1553 05/02/18  1110 05/02/18  0617 05/01/18  2051 05/01/18  1621 05/01/18  1114 05/01/18  0610 04/30/18  2140 04/30/18  1647 04/30/18  1153   POC GLUCOSE mg/dl 157* 242* 216* 240* 141* 191* 166* 192* 111 121 89 88             * I Have Reviewed All Lab Data Listed Above  * Additional Pertinent Lab Tests Reviewed:  [address] [facility] Admission Reviewed      Recent Cultures (last 7 days):           Last 24 Hours Medication List:     Current Facility-Administered Medications:  acetaminophen 650 mg Oral Q4H PRN Tonye Snare, CRNP   atorvastatin 40 mg Oral Daily With [employer], CRNP   buPROPion 150 mg Oral BID Tonye Snare, CRNP   carbidopa-levodopa 1 tablet Oral Q6H Tonye Snare, CRNP   cholecalciferol 2,000 Units Oral Daily Charmaine Neville, ANTON   DULoxetine 20 mg Oral BID Tonye Snare, CRNP   ezetimibe 10 mg Oral Daily Tonye Snare, CRNP   fluticasone-salmeterol 1 puff Inhalation Q12H [address] ANTON Hodges   furosemide 80 mg Intravenous BID (diuretic) Maykel Motta MD   insulin glargine 8 Units Subcutaneous HS Sujey Angel MD   insulin lispro 1-5 Units Subcutaneous TID AC ANTON Hodges   insulin lispro 1-5 Units Subcutaneous HS ANTON Hodges   levothyroxine 50 mcg Oral Early Morning ANTON Hodges   magnesium oxide 400 mg Oral Daily ANTON Hodges   metoprolol tartrate 25 mg Oral BID ANTON Hodges   pantoprazole 40 mg Oral Daily Before Breakfast ANTON Hodges   potassium chloride 20 mEq Oral Once Becky Alert, DO   potassium chloride 20 mEq Oral BID ANTON Hunter tiotropium 18 mcg Inhalation Daily ANTON Salvador   warfarin 2 5 mg Oral Daily ANTON Crenshaw        Today, Patient Was Seen By: Lowell Frazier MD    ** Please Note: Dictation voice to text software may have been used in the creation of this document   **

## 2018-05-03 NOTE — PROGRESS NOTES
Cardiology Progress Note - Zenobia Regalado 74 y o  male MRN: [id number]    Unit/Bed#: [address] [place] [address] [id number] Encounter: [id number]      Assessment:  Principal Problem:    Acute on chronic diastolic congestive heart failure (HCC)  Active Problems:    Parkinson disease ([address] )    Obesity due to excess calories    Hypertension    Diabetes mellitus with hypoglycemia (HCC)    COPD (chronic obstructive pulmonary disease) (HCC)    Permanent atrial fibrillation (HCC)    Hypomagnesemia    Anemia      Plan:    1  Acute on chronic diastolic CHF - Volume status improving, but we will continue IV Lasix today  Continue to follow urine output, daily labs and weight  Likely transition to oral diuretics tomorrow  Creatinine has been stable  2   Permanent atrial fibrillation - Heart rates for the most part controlled  Continue all medical therapy as prescribed  On Coumadin for stroke prevention  INR therapeutic  Subjective:   Patient seen and examined  No significant events overnight  Patient states breathing is improved  Lying flat comfortably  Objective:     Vitals: Blood pressure 129/76, pulse 100, temperature (!) 97 2 °F (36 2 °C), temperature source Oral, resp  rate 20, weight 128 kg (281 lb 12 oz), SpO2 92 %  , There is no height or weight on file to calculate BMI ,   Orthostatic Blood Pressures      Most Recent Value   Blood Pressure  129/76 filed at 05/03/2018 1543   Patient Position - Orthostatic VS  Lying filed at 05/03/2018 1543            Intake/Output Summary (Last 24 hours) at 05/03/18 1610  Last data filed at 05/03/18 1201   Gross per 24 hour   Intake              250 ml   Output             1350 ml   Net            -1100 ml         Physical Exam:    GEN: Zenobia Regalado appears well, alert and oriented x 3, pleasant and cooperative, obese  HEENT: pupils equal, round, and reactive to light; extraocular muscles intact  NECK: supple, no carotid bruits   +JVD lying   HEART: irregular rhythm, normal S1 and S2, no murmurs, clicks, gallops or rubs   LUNGS:  Diminished bilaterally; no wheezes, rales, or rhonchi   ABDOMEN: normal bowel sounds, soft, no tenderness, no distention  EXTREMITIES: peripheral pulses normal; no clubbing, cyanosis   ++ Edema - bilateral  NEURO: no focal findings   SKIN: normal without suspicious lesions on exposed skin        Medications:      Current Facility-Administered Medications:     acetaminophen (TYLENOL) tablet 650 mg, 650 mg, Oral, Q4H PRN, ANTON Monsivais    atorvastatin (LIPITOR) tablet 40 mg, 40 mg, Oral, Daily With Dinner, Rafael ANTON Brunson, 40 mg at 05/02/18 1643    buPROPion (WELLBUTRIN SR) 12 hr tablet 150 mg, 150 mg, Oral, BID, ANTON Hodges, 150 mg at 05/03/18 0841    carbidopa-levodopa (SINEMET)  mg per tablet 1 tablet, 1 tablet, Oral, Q6H, ANTON Hodges, 1 tablet at 05/03/18 1340    cholecalciferol (VITAMIN D3) tablet 2,000 Units, 2,000 Units, Oral, Daily, ANOTN Monsivais, 2,000 Units at 05/03/18 0840    DULoxetine (CYMBALTA) delayed release capsule 20 mg, 20 mg, Oral, BID, ANTON Hodges, 20 mg at 05/03/18 0841    ezetimibe (ZETIA) tablet 10 mg, 10 mg, Oral, Daily, ANTON Hodges, 10 mg at 05/03/18 0840    fluticasone-salmeterol (ADVAIR) 250-50 mcg/dose inhaler 1 puff, 1 puff, Inhalation, Q12H [facility], ANTON Hodges, 1 puff at 05/03/18 1002    furosemide (LASIX) injection 80 mg, 80 mg, Intravenous, BID (diuretic), Toya Porras MD, 80 mg at 05/03/18 0837    insulin glargine (LANTUS) subcutaneous injection 8 Units 0 08 mL, 8 Units, Subcutaneous, HS, Mackenzie Roca MD    insulin lispro (HumaLOG) 100 units/mL subcutaneous injection 1-5 Units, 1-5 Units, Subcutaneous, TID AC, 1 Units at 05/03/18 1340 **AND** Fingerstick Glucose (POCT), , , TID AC, ANTON Hodges    insulin lispro (HumaLOG) 100 units/mL subcutaneous injection 1-5 Units, 1-5 Units, Subcutaneous, HS, ANTON Hodges, 2 Units at 05/02/18 2140    levothyroxine tablet 50 mcg, 50 mcg, Oral, Early Morning, Charmaine Neville, CRNP, 50 mcg at 05/03/18 0508    magnesium oxide (MAG-OX) tablet 400 mg, 400 mg, Oral, Daily, Zaria Kori Neville, CRNP, 400 mg at 05/03/18 0840    metoprolol tartrate (LOPRESSOR) tablet 25 mg, 25 mg, Oral, BID, Charmainealbert Neville, CRNP, 25 mg at 05/03/18 0840    pantoprazole (PROTONIX) EC tablet 40 mg, 40 mg, Oral, Daily Before Breakfast, Charmainealbert Neville, CRNP, 40 mg at 05/03/18 0606    potassium chloride (K-DUR,KLOR-CON) CR tablet 20 mEq, 20 mEq, Oral, Once, Christy Leanjanad, DO    potassium chloride (K-DUR,KLOR-CON) CR tablet 20 mEq, 20 mEq, Oral, BID, Charmaine Neville CRNP, 20 mEq at 05/03/18 0840    tiotropium (SPIRIVA) capsule for inhaler 18 mcg, 18 mcg, Inhalation, Daily, Charmainealbert Neville, CRNP, 18 mcg at 05/03/18 1002    warfarin (COUMADIN) tablet 2 5 mg, 2 5 mg, Oral, Daily, Charmainealbert Banksrifred, CRNP, 2 5 mg at 05/02/18 1719     Labs & Results:      Results from last 7 days  Lab Units 04/30/18  0503 04/30/18  0132 04/29/18  2129   TROPONIN I ng/mL <0 02 <0 02 <0 02       Results from last 7 days  Lab Units 05/03/18  0506 05/02/18  0530 04/30/18  0504   WBC Thousand/uL 8 52 7 99 7 62   HEMOGLOBIN g/dL 9 4* 9 1* 9 5*   HEMATOCRIT % 32 0* 30 8* 32 1*   PLATELETS Thousands/uL 274 244 222       Results from last 7 days  Lab Units 04/30/18  0503   CHOLESTEROL mg/dL 61   TRIGLYCERIDES mg/dL 39   HDL mg/dL 33*       Results from last 7 days  Lab Units 05/03/18  0506 05/02/18  0530 05/01/18  1202 04/30/18  1714  04/29/18  2129   SODIUM mmol/L 141 142  --  145  < > 146*   POTASSIUM mmol/L 4 1 3 3* 3 8 3 4*  < > 2 9*   CHLORIDE mmol/L 103 103  --  105  < > 104   CO2 mmol/L 32 33*  --  35*  < > 36*   BUN mg/dL 20 20  --  18  < > 19   CREATININE mg/dL 1 07 1 08  --  1 04  < > 1 14   CALCIUM mg/dL 8 4 8 2*  --  8 1*  < > 7 7*   TOTAL PROTEIN g/dL  --   --   --   --   --  6 5   BILIRUBIN TOTAL mg/dL  --   --   --   --   --  1 20*   ALK PHOS U/L  --   --   --   --   --  119*   ALT U/L  --   --   --   --   --  8*   AST U/L  --   --   --   --   --  16   GLUCOSE RANDOM mg/dL 149* 143*  --  88  < > 52*   < > = values in this interval not displayed  Results from last 7 days  Lab Units 05/02/18  0530 04/30/18  0503 04/29/18 2129   INR  2 54* 2 44* 2 38*   PTT seconds  --   --  46*       Results from last 7 days  Lab Units 04/30/18  0503 04/29/18 2129   MAGNESIUM mg/dL 1 6 1 3*       EKG personally reviewed by Bushra Carter MD   Atrial fibrillation, PVCs, nonspecific ST and T-wave changes  ECHO:  LEFT VENTRICLE:  Systolic function was normal  Ejection fraction was estimated to be 60 %  Although no diagnostic regional wall motion abnormality was identified, this possibility cannot be completely excluded on the basis of this study  Wall thickness was mildly to moderately increased      LEFT ATRIUM:  The atrium was mildly dilated      RIGHT ATRIUM:  The atrium was mildly dilated      MITRAL VALVE:  There was mild annular calcification  There was trace regurgitation      TRICUSPID VALVE:  There was mild regurgitation  Pulmonary artery systolic pressure was within the normal range        Counseling / Coordination of Care  Total floor / unit time spent today 25 minutes  Greater than 50% of total time was spent with the patient and / or family counseling and / or coordination of care

## 2018-05-03 NOTE — SOCIAL WORK
Continue to follow  Spoke with Pt's dtr(Kayy) this morning, Pt's dtr is now requesting [facility] as first choice or [facility] instead of [facility]  Referral sent to [facility] and [facility] via [facility]  [facility] and [facility] have bed available for Pt  Call made to Pt's dtr(Kayy), left message informing that [facility] can accept Pt  Anticipate return call  Left message with Pt's [employer] insurance to inform update of facility is now [facility]  Await determination  Will follow

## 2018-05-04 VITALS
DIASTOLIC BLOOD PRESSURE: 74 MMHG | SYSTOLIC BLOOD PRESSURE: 126 MMHG | RESPIRATION RATE: 20 BRPM | WEIGHT: 284.39 LBS | TEMPERATURE: 97.8 F | HEART RATE: 110 BPM | OXYGEN SATURATION: 95 %

## 2018-05-04 LAB
ANION GAP SERPL CALCULATED.3IONS-SCNC: 8 MMOL/L (ref 4–13)
BASOPHILS # BLD MANUAL: 0 THOUSAND/UL (ref 0–0.1)
BASOPHILS NFR MAR MANUAL: 0 % (ref 0–1)
BUN SERPL-MCNC: 18 MG/DL (ref 5–25)
CALCIUM SERPL-MCNC: 8.6 MG/DL (ref 8.3–10.1)
CHLORIDE SERPL-SCNC: 100 MMOL/L (ref 100–108)
CO2 SERPL-SCNC: 31 MMOL/L (ref 21–32)
CREAT SERPL-MCNC: 1.14 MG/DL (ref 0.6–1.3)
EOSINOPHIL # BLD MANUAL: 0.18 THOUSAND/UL (ref 0–0.4)
EOSINOPHIL NFR BLD MANUAL: 2 % (ref 0–6)
ERYTHROCYTE [DISTWIDTH] IN BLOOD BY AUTOMATED COUNT: 15.3 % (ref 11.6–15.1)
GFR SERPL CREATININE-BSD FRML MDRD: 63 ML/MIN/1.73SQ M
GLUCOSE SERPL-MCNC: 134 MG/DL (ref 65–140)
GLUCOSE SERPL-MCNC: 153 MG/DL (ref 65–140)
GLUCOSE SERPL-MCNC: 234 MG/DL (ref 65–140)
HCT VFR BLD AUTO: 32.9 % (ref 36.5–49.3)
HGB BLD-MCNC: 10 G/DL (ref 12–17)
INR PPP: 2.19 (ref 0.86–1.16)
LYMPHOCYTES # BLD AUTO: 1.69 THOUSAND/UL (ref 0.6–4.47)
LYMPHOCYTES # BLD AUTO: 19 % (ref 14–44)
MCH RBC QN AUTO: 29.5 PG (ref 26.8–34.3)
MCHC RBC AUTO-ENTMCNC: 30.4 G/DL (ref 31.4–37.4)
MCV RBC AUTO: 97 FL (ref 82–98)
MONOCYTES # BLD AUTO: 0.98 THOUSAND/UL (ref 0–1.22)
MONOCYTES NFR BLD: 11 % (ref 4–12)
NEUTROPHILS # BLD MANUAL: 6.04 THOUSAND/UL (ref 1.85–7.62)
NEUTS BAND NFR BLD MANUAL: 1 % (ref 0–8)
NEUTS SEG NFR BLD AUTO: 67 % (ref 43–75)
PLATELET # BLD AUTO: 281 THOUSANDS/UL (ref 149–390)
PLATELET BLD QL SMEAR: ADEQUATE
PMV BLD AUTO: 11.1 FL (ref 8.9–12.7)
POTASSIUM SERPL-SCNC: 3.5 MMOL/L (ref 3.5–5.3)
PROTHROMBIN TIME: 24.2 SECONDS (ref 12.1–14.4)
RBC # BLD AUTO: 3.39 MILLION/UL (ref 3.88–5.62)
RBC MORPH BLD: NORMAL
SODIUM SERPL-SCNC: 139 MMOL/L (ref 136–145)
TOTAL CELLS COUNTED SPEC: 100
WBC # BLD AUTO: 8.88 THOUSAND/UL (ref 4.31–10.16)

## 2018-05-04 PROCEDURE — 97112 NEUROMUSCULAR REEDUCATION: CPT

## 2018-05-04 PROCEDURE — 85610 PROTHROMBIN TIME: CPT | Performed by: HOSPITALIST

## 2018-05-04 PROCEDURE — 97535 SELF CARE MNGMENT TRAINING: CPT

## 2018-05-04 PROCEDURE — 80048 BASIC METABOLIC PNL TOTAL CA: CPT | Performed by: HOSPITALIST

## 2018-05-04 PROCEDURE — 85007 BL SMEAR W/DIFF WBC COUNT: CPT | Performed by: HOSPITALIST

## 2018-05-04 PROCEDURE — 82948 REAGENT STRIP/BLOOD GLUCOSE: CPT

## 2018-05-04 PROCEDURE — 94760 N-INVAS EAR/PLS OXIMETRY 1: CPT

## 2018-05-04 PROCEDURE — 99239 HOSP IP/OBS DSCHRG MGMT >30: CPT | Performed by: HOSPITALIST

## 2018-05-04 PROCEDURE — 85027 COMPLETE CBC AUTOMATED: CPT | Performed by: HOSPITALIST

## 2018-05-04 PROCEDURE — 97530 THERAPEUTIC ACTIVITIES: CPT

## 2018-05-04 PROCEDURE — 94640 AIRWAY INHALATION TREATMENT: CPT

## 2018-05-04 RX ORDER — FUROSEMIDE 20 MG/1
40 TABLET ORAL DAILY
Qty: 60 TABLET | Refills: 0 | Status: SHIPPED | OUTPATIENT
Start: 2018-05-04 | End: 2018-06-03

## 2018-05-04 RX ORDER — INSULIN GLARGINE 100 [IU]/ML
10 INJECTION, SOLUTION SUBCUTANEOUS
Qty: 10 ML | Refills: 0 | Status: SHIPPED | OUTPATIENT
Start: 2018-05-04

## 2018-05-04 RX ADMIN — TIOTROPIUM BROMIDE 18 MCG: 18 CAPSULE ORAL; RESPIRATORY (INHALATION) at 10:45

## 2018-05-04 RX ADMIN — DULOXETINE HYDROCHLORIDE 20 MG: 20 CAPSULE, DELAYED RELEASE ORAL at 08:29

## 2018-05-04 RX ADMIN — VITAMIN D, TAB 1000IU (100/BT) 2000 UNITS: 25 TAB at 08:27

## 2018-05-04 RX ADMIN — CARBIDOPA AND LEVODOPA 1 TABLET: 25; 100 TABLET ORAL at 13:57

## 2018-05-04 RX ADMIN — INSULIN LISPRO 2 UNITS: 100 INJECTION, SOLUTION INTRAVENOUS; SUBCUTANEOUS at 13:53

## 2018-05-04 RX ADMIN — EZETIMIBE 10 MG: 10 TABLET ORAL at 08:26

## 2018-05-04 RX ADMIN — PANTOPRAZOLE SODIUM 40 MG: 40 TABLET, DELAYED RELEASE ORAL at 08:26

## 2018-05-04 RX ADMIN — CARBIDOPA AND LEVODOPA 1 TABLET: 25; 100 TABLET ORAL at 02:34

## 2018-05-04 RX ADMIN — Medication 400 MG: at 08:27

## 2018-05-04 RX ADMIN — POTASSIUM CHLORIDE 20 MEQ: 1500 TABLET, EXTENDED RELEASE ORAL at 08:26

## 2018-05-04 RX ADMIN — METOPROLOL TARTRATE 25 MG: 25 TABLET ORAL at 08:27

## 2018-05-04 RX ADMIN — LEVOTHYROXINE SODIUM 50 MCG: 25 TABLET ORAL at 08:27

## 2018-05-04 RX ADMIN — FUROSEMIDE 80 MG: 10 INJECTION, SOLUTION INTRAVENOUS at 08:18

## 2018-05-04 RX ADMIN — FLUTICASONE PROPIONATE AND SALMETEROL 1 PUFF: 50; 250 POWDER RESPIRATORY (INHALATION) at 10:45

## 2018-05-04 RX ADMIN — CARBIDOPA AND LEVODOPA 1 TABLET: 25; 100 TABLET ORAL at 08:26

## 2018-05-04 RX ADMIN — INSULIN LISPRO 1 UNITS: 100 INJECTION, SOLUTION INTRAVENOUS; SUBCUTANEOUS at 08:28

## 2018-05-04 RX ADMIN — BUPROPION HYDROCHLORIDE 150 MG: 150 TABLET, EXTENDED RELEASE ORAL at 08:29

## 2018-05-04 NOTE — ASSESSMENT & PLAN NOTE
-on admission the patient had acute hypoxemic respiratory failure due to due to acute on chronic diastolic congestive heart failure exacerbation  This has resolved  -Echo:  EF 60%  -patient received IV Lasix while in the hospital and diuresed over 11 L    He will be discharged on Lasix 40 mg p o  q day  -creatinine stable  -appreciate Cardiology

## 2018-05-04 NOTE — PLAN OF CARE
Problem: PHYSICAL THERAPY ADULT  Goal: Performs mobility at highest level of function for planned discharge setting  See evaluation for individualized goals  Treatment/Interventions: Functional transfer training, LE strengthening/ROM, Therapeutic exercise, Endurance training, Patient/family training, Equipment eval/education, Bed mobility, Gait training, Continued evaluation, Spoke to nursing, Spoke to case management, OT  Equipment Recommended: Leandra Bill       See flowsheet documentation for full assessment, interventions and recommendations  Outcome: Progressing  Prognosis: Good  Problem List: Decreased strength, Decreased endurance, Impaired balance, Decreased mobility, Decreased coordination, Decreased skin integrity  Assessment: Pt able to sit edge of bed x 10 min with assist for balance as he was leaning R   Returned to bed and repositioned in chair mode    Needs in reach  Continue to recommend in-pt rehab atd/c  Will follow as luisito  Barriers to Discharge: Decreased caregiver support, Inaccessible home environment     Recommendation: Short-term skilled PT     PT - OK to Discharge: Yes    See flowsheet documentation for full assessment

## 2018-05-04 NOTE — PLAN OF CARE
Problem: OCCUPATIONAL THERAPY ADULT  Goal: Performs self-care activities at highest level of function for planned discharge setting  See evaluation for individualized goals  Treatment Interventions: ADL retraining, UE strengthening/ROM, Endurance training, Patient/family training, Compensatory technique education          See flowsheet documentation for full assessment, interventions and recommendations  Outcome: Progressing  Limitation: Decreased ADL status, Decreased UE strength, Decreased UE ROM, Decreased cognition, Decreased Safe judgement during ADL, Decreased endurance, Decreased self-care trans, Decreased fine motor control  Prognosis: Fair  Assessment: Pt seen bedside for OT treatment  Denies breathlessness  Agreeable to participate in OT  Focus of treatment on improving ADL, UE movement pattern  Increased time and assist thoroughness for grooming tasks supported sit chair position bed  Bradykinetic, mild UB rigidty  R UE WFL  L cues and  Support for gross - functional assist (~ 6 month hx L shoulder fx with limitations) Good tolerance/ participation for above activity  Motivated  Wants rehab that " will push him"   Making slow steady progress  Continues with following occupational deficits: weakness, balance, body mechanics with  Rigidity and slow patterns of movement, safety, decreased activity tolerance   Training provided in : safety, precautions,  body mechanics and compensatory tech for ADL  Continues to function well below baseline  Continue to follow per OT POC   Recommend STR  Recommendation: PT consult  OT Discharge Recommendation: Short Term Rehab  OT - OK to Discharge:  Yes

## 2018-05-04 NOTE — SOCIAL WORK
Continue to follow  [facility] can accept Pt today  Insurance auth obtained from Pt's insurance(Kiara: [phone]), auth # [id number] update 5/9 to Taryn Wharton at [phone], fax # [phone]  Spoke with Jak Mccullough at [facility], transportation arranged via [facility] to [facility]  Geraldine Mitchlel is now 3:30pm  [facility] admissions and liaison made aware of auth  Left message with [facility] admissions(Bree:[phone]) and liaison(Norma) with updated pickup time  Pt's dtr(Kayy: [phone]) made aware of pickup time  All in agreement

## 2018-05-04 NOTE — DISCHARGE SUMMARY
Discharge- Malvin Ree 1945, 74 y o  male MRN: [id number]    Unit/Bed#: [address] [place], [place] [id number] Encounter: [id number]    Primary Care Provider: Meg Viramontes MD   Date and time admitted to hospital: 4/29/2018  8:34 PM        Anemia   Assessment & Plan    -iron panel unremarkable  -suspect anemia of chronic disease  -hemoglobin stable        Hypomagnesemia   Assessment & Plan    -continue oral magnesium        Permanent atrial fibrillation (HCC)   Assessment & Plan    -continue Coumadin, INR therapeutic  -continue beta-blocker        COPD (chronic obstructive pulmonary disease) ([facility])   Assessment & Plan    -continue Spiriva and Advair        Diabetes mellitus with hypoglycemia ([facility])   Assessment & Plan    -increase Lantus to 10 units at bedtime on discharge  -start insulin lispro 2 units pre meal on discharge        Hypertension   Assessment & Plan    -continue beta-blocker        Obesity due to excess calories   Assessment & Plan    -encourage weight loss        Parkinson disease ([facility])   Assessment & Plan    -continue Sinemet        * Acute on chronic diastolic congestive heart failure (HCC)   Assessment & Plan    -on admission the patient had acute hypoxemic respiratory failure due to due to acute on chronic diastolic congestive heart failure exacerbation  This has resolved  -Echo:  EF 60%  -patient received IV Lasix while in the hospital and diuresed over 11 L    He will be discharged on Lasix 40 mg p o  q day  -creatinine stable  -appreciate Cardiology          Discharging Physician / Practitioner: Vinita Farris MD  PCP: Meg Viramontes MD  Admission Date:   Admission Orders     Ordered        04/29/18 2357  Inpatient Admission (expected length of stay for this patient is greater than two midnights)  Once             Discharge Date: 05/04/18    Resolved Problems  Date Reviewed: 5/3/2018          Resolved    Acute respiratory failure with hypoxia ([facility] ) 5/3/2018     Resolved by  Vinita Farris MD Hypokalemia 5/3/2018     Resolved by  Olena Tripp MD          Consultations During Hospital Stay:  · Cardiology    Procedures Performed:     · None    Significant Findings / Test Results:     · See above    Incidental Findings:   · None     Test Results Pending at Discharge (will require follow up): · None     Outpatient Tests Requested:  · CMP including magnesium in 3 days    Complications:  None    Reason for Admission:  Shortness of    Hospital Course:     Nika Kohli is a 74 y o  male patient who originally presented to the hospital on 4/29/2018 due to shortness of breath due to acute diastolic congestive heart failure exacerbation as outlined in the H&P done on admission  Please see above list of diagnoses and related plan for additional information  Condition at Discharge: good     Discharge Day Visit / Exam:     Subjective:  No acute complaints    Vitals: Blood Pressure: 136/74 (05/04/18 0823)  Pulse: 104 (05/04/18 0823)  Temperature: (!) 97 4 °F (36 3 °C) (05/04/18 0700)  Temp Source: Oral (05/04/18 0700)  Respirations: 20 (05/04/18 0700)  Weight - Scale: 129 kg (284 lb 6 3 oz) (05/04/18 0700)  SpO2: 97 % (05/04/18 1049)  Exam:   Physical Exam  Gen: NAD, awake alert and oriented times 2-3, well developed, well nourished  Eyes: EOMI, PERRLA, no scleral icterus  ENMT:  Oropharynx clear of erythema or exudates, no nasal discharge, no otic discharge, moist mucous membranes  Neck:  Supple  Cardiovascular:  Normal rate, Irregularly irregular rhythm, normal rate, normal S1-S2, no murmurs, rubs, or gallops  Lungs:   clear to auscultation bilaterally, no wheezes, or rales, or rhonchi  Abdomen:  Positive bowel sounds, soft, nontender, nondistended, no palpable organomegaly   Skin:  Intact, no obvious lesions or rashes, no lower extremity edema  Neuro: Cranial nerves 2-12 are intact, non-focal    Discharge instructions/Information to patient and family:   See after visit summary for information provided to patient and family  Provisions for Follow-Up Care:  See after visit summary for information related to follow-up care and any pertinent home health orders  Disposition:     Johana Hoffman at [address] to [facility] SNF:   · Not Applicable to this Patient - Not Applicable to this Patient    Planned Readmission: None     Discharge Statement:  I spent 30 minutes discharging the patient  This time was spent on the day of discharge  I had direct contact with the patient on the day of discharge  Greater than 50% of the total time was spent examining patient, answering all patient questions, arranging and discussing plan of care with patient as well as directly providing post-discharge instructions  Additional time then spent on discharge activities  Discharge Medications:  See after visit summary for reconciled discharge medications provided to patient and family        ** Please Note: This note has been constructed using a voice recognition system **

## 2018-05-04 NOTE — PHYSICAL THERAPY NOTE
PT tx     05/04/18 0855   Pain Assessment   Pain Assessment No/denies pain   Pain Score No Pain   Restrictions/Precautions   Other Precautions Contact/isolation  (davis)   General   Chart Reviewed Yes   Cognition   Overall Cognitive Status WFL   Arousal/Participation Alert   Attention Within functional limits   Orientation Level Oriented to person;Oriented to place   Memory Within functional limits   Following Commands Follows one step commands with increased time or repetition   Comments patricia kinetic  just medicated   Subjective   Subjective I can't stand  reports several falls pta   Bed Mobility   Rolling R 3  Moderate assistance   Additional items Assist x 2;Bedrails   Rolling L 3  Moderate assistance   Additional items Assist x 2;Bedrails   Supine to Sit 2  Maximal assistance   Additional items Assist x 2; Increased time required;LE management;HOB elevated; Bedrails   Sit to Supine 3  Moderate assistance   Additional items Assist x 2   Balance   Static Sitting Fair -   Dynamic Sitting Poor +   Endurance Deficit   Endurance Deficit Yes   Endurance Deficit Description tires easily   Activity Tolerance   Activity Tolerance Patient limited by fatigue   Nurse Made Aware yes   Exercises   Knee AROM Long Arc Quad Sitting;5 reps;AROM; Right;Left   TKR 5 reps; Supine   Assessment   Prognosis Good   Problem List Decreased strength;Decreased endurance; Impaired balance;Decreased mobility; Decreased coordination;Decreased skin integrity   Assessment Pt able to sit edge of bed x 10 min with assist for balance as he was leaning R   Returned to bed and repositioned in chair mode    Needs in reach  Continue to recommend in-pt rehab atd/c  Will follow as luisito  Barriers to Discharge Decreased caregiver support; Inaccessible home environment   Goals   Patient Goals get better at rehab   Plan   Treatment/Interventions ADL retraining;Functional transfer training;LE strengthening/ROM; Therapeutic exercise; Endurance training;Patient/family training;Equipment eval/education; Bed mobility;Spoke to nursing   Progress Progressing toward goals   PT Frequency 5x/wk   Recommendation   Recommendation Short-term skilled PT   Equipment Recommended Wheelchair   PT - OK to Discharge Yes   Additional Comments to STR with medical clearance   Terrence Owens, PT

## 2018-05-04 NOTE — OCCUPATIONAL THERAPY NOTE
[address] Progress Note     Patient Name: Aurea Delong  IXQGT'O Date: 5/4/2018  Problem List  Patient Active Problem List   Diagnosis    Parkinson disease ([facility] )    Obesity due to excess calories    Hypertension    Diabetes mellitus with hypoglycemia ([facility] )    COPD (chronic obstructive pulmonary disease) ([facility] )    Acute on chronic diastolic congestive heart failure (HCC)    Permanent atrial fibrillation ([facility] )    Hypomagnesemia    Anemia        05/04/18 0940   Restrictions/Precautions   Weight Bearing Precautions Per Order No   Other Precautions Contact/isolation; Fall Risk;Pain  (davis cath)   Pain Assessment   Pain Assessment 0-10   Pain Score 8   Pain Type Chronic pain   Pain Location Shoulder   Pain Orientation Left   Pain Descriptors Aching   Pain Frequency Intermittent  (with movement )   Effect of Pain on Daily Activities limits L UE use for ADL   Patient's Stated Pain Goal No pain   Hospital Pain Intervention(s) Repositioned;Distraction; Emotional support   Diversional Activities Television   ADL   Where Assessed Supine, bed  (chair position )   Eating Assistance 5  Supervision/Setup   Eating Deficit Beverage management   Grooming Assistance 4  Minimal Assistance   Grooming Deficit Denture care;Brushing hair   Grooming Comments assist for thoroughness, cues L UE use for at least gross assist    Toileting Comments davis cath   Bed Mobility   Additional Comments supported to unsupported sit with rail use and mod a x1 x 3 trial    Transfers   Sit to Stand Unable to assess   Additional Comments seated EOB with PT just prior to arrival with  R lean and assist     Functional Mobility   Additional Comments unable    Therapeutic Exercise - ROM   UE-ROM Yes   ROM - Left Upper Extremities    L Shoulder AAROM   L Elbow AROM   L Hand AROM  (gross  grasp and release )   LUE ROM Comment hand to mouth with elbow support x 5   (pt reports recent hx L shoulder fx from syncope and fall NOV)   Cognition   Overall Cognitive Status WFL   Arousal/Participation Alert; Cooperative   Attention Attends with cues to redirect   Orientation Level Oriented X4   Memory Within functional limits   Following Commands Follows one step commands with increased time or repetition   Comments pleasant and cooperative  Difficulty Dividing attention between task and conversation     Activity Tolerance   Activity Tolerance Patient limited by fatigue;Patient limited by pain   Medical Staff Made Aware RN consents   Assessment   Assessment Pt seen bedside for OT treatment  Denies breathlessness  Agreeable to participate in OT  Focus of treatment on improving ADL, UE movement pattern  Increased time and assist thoroughness for grooming tasks supported sit chair position bed  Bradykinetic, mild UB rigidty  R UE WFL  L cues and  Support for gross - functional assist (~ 6 month hx L shoulder fx with limitations) Good tolerance/ participation for above activity  Motivated  Wants rehab that " will push him"   Making slow steady progress  Continues with following occupational deficits: weakness, balance, body mechanics with  Rigidity and slow patterns of movement, safety, decreased activity tolerance   Training provided in : safety, precautions,  body mechanics and compensatory tech for ADL  Continues to function well below baseline  Continue to follow per OT POC   Recommend STR  Plan   Treatment Interventions ADL retraining;UE strengthening/ROM; Endurance training;Patient/family training; Compensatory technique education   Goal Expiration Date 05/09/18   OT Frequency 2-3x/wk   Recommendation   OT Discharge Recommendation Short Term Rehab   OT - OK to Discharge Yes   Barthel Index   Feeding 5   Bathing 0   Grooming Score 0   Dressing Score 0   Bladder Score 0   Bowels Score 5   Toilet Use Score 0   Transfers (Bed/Chair) Score 5   Mobility (Level Surface) Score 0   Stairs Score 0   Barthel Index Score 15     LINDSEY ShepardR/L

## 2018-05-04 NOTE — ASSESSMENT & PLAN NOTE
-increase Lantus to 10 units at bedtime on discharge  -start insulin lispro 2 units pre meal on discharge

## 2018-05-04 NOTE — DISCHARGE INSTRUCTIONS
We have changed her diabetic medications  Please check your blood sugars before meals and bedtime  If your blood sugars are greater than 180 please discuss with your primary care physician